# Patient Record
Sex: FEMALE | Race: WHITE | Employment: OTHER | URBAN - METROPOLITAN AREA
[De-identification: names, ages, dates, MRNs, and addresses within clinical notes are randomized per-mention and may not be internally consistent; named-entity substitution may affect disease eponyms.]

---

## 2017-01-26 ENCOUNTER — OFFICE VISIT (OUTPATIENT)
Dept: FAMILY MEDICINE CLINIC | Age: 77
End: 2017-01-26

## 2017-01-26 VITALS
BODY MASS INDEX: 25.74 KG/M2 | TEMPERATURE: 98.2 F | DIASTOLIC BLOOD PRESSURE: 84 MMHG | RESPIRATION RATE: 18 BRPM | SYSTOLIC BLOOD PRESSURE: 130 MMHG | HEIGHT: 67 IN | HEART RATE: 56 BPM | OXYGEN SATURATION: 97 % | WEIGHT: 164 LBS

## 2017-01-26 DIAGNOSIS — I48.20 CHRONIC ATRIAL FIBRILLATION (HCC): Primary | ICD-10-CM

## 2017-01-26 DIAGNOSIS — Z71.89 ACP (ADVANCE CARE PLANNING): ICD-10-CM

## 2017-01-26 RX ORDER — DILTIAZEM HYDROCHLORIDE EXTENDED-RELEASE TABLETS 240 MG/1
TABLET, EXTENDED RELEASE ORAL
COMMUNITY
End: 2018-09-12

## 2017-01-26 RX ORDER — LISINOPRIL 5 MG/1
TABLET ORAL DAILY
COMMUNITY
End: 2017-02-23 | Stop reason: SDUPTHER

## 2017-01-26 NOTE — PROGRESS NOTES
Pt here to [facility], states she has not been to . In 28 years  Was discharged from [facility] 1/19, dx'd with afib

## 2017-01-26 NOTE — MR AVS SNAPSHOT
Visit Information Date & Time Provider Department Dept. Phone Encounter #  
 1/26/2017  8:45 AM peerTransfer, [address] [phone] [id number] Follow-up Instructions Return in about 1 month (around 2/26/2017), or if symptoms worsen or fail to improve. Upcoming Health Maintenance Date Due DTaP/Tdap/Td series (1 - Tdap) 3/14/1962 ZOSTER VACCINE AGE 60> 3/14/2001 GLAUCOMA SCREENING Q2Y 3/14/2006 OSTEOPOROSIS SCREENING (DEXA) 3/14/2006 Pneumococcal 65+ Low/Medium Risk (1 of 2 - PCV13) 3/14/2006 MEDICARE YEARLY EXAM 3/14/2006 INFLUENZA AGE 9 TO ADULT 8/1/2016 Allergies as of 1/26/2017  Review Complete On: 1/26/2017 By: peerTransfer, DO No Known Allergies Current Immunizations  Never Reviewed Name Date Influenza Vaccine 1/17/2017 Not reviewed this visit You Were Diagnosed With   
  
 Codes Comments Chronic atrial fibrillation (HCC)    -  Primary ICD-10-CM: M43.8 ICD-9-CM: 427.31   
 ACP (advance care planning)     ICD-10-CM: Z71.89 ICD-9-CM: V65.49 discussed Vitals BP Pulse Temp Resp Height(growth percentile) Weight(growth percentile) 130/84 (!) 56 98.2 °F (36.8 °C) (Oral) 18 5' 7\" (1.702 m) 164 lb (74.4 kg) SpO2 BMI OB Status Smoking Status 97% 25.69 kg/m2 Menopause Former Smoker Vitals History BMI and BSA Data Body Mass Index Body Surface Area  
 25.69 kg/m 2 1.88 m 2 Preferred Pharmacy Pharmacy Name Phone [facility] [place], [address] [place] [facility] [phone] Your Updated Medication List  
  
   
This list is accurate as of: 1/26/17  9:36 AM.  Always use your most recent med list.  
  
  
  
  
 apixaban 5 mg tablet Commonly known as:  Gary Costain Take 1 Tab by mouth two (2) times a day. diltiazem hcl 240 mg Tb24 Take  by mouth.  
  
 lisinopril 5 mg tablet Commonly known as:  Angel Bajwa Take  by mouth daily. Prescriptions Sent to Pharmacy Refills  
 apixaban (ELIQUIS) 5 mg tablet 5 Sig: Take 1 Tab by mouth two (2) times a day. Class: Normal  
 Pharmacy: [facility] [address] [facility] #: [phone] Route: Oral  
  
We Performed the Following METABOLIC PANEL, COMPREHENSIVE [46313 CPT(R)] TSH 3RD GENERATION [36692 CPT(R)] Follow-up Instructions Return in about 1 month (around 2/26/2017), or if symptoms worsen or fail to improve. Patient Instructions Atrial Fibrillation: Care Instructions Your Care Instructions Atrial fibrillation is an irregular and often fast heartbeat. Treating this condition is important for several reasons. It can cause blood clots, which can travel from your heart to your brain and cause a stroke. If you have a fast heartbeat, you may feel lightheaded, dizzy, and weak. An irregular heartbeat can also increase your risk for heart failure. Atrial fibrillation is often the result of another heart condition, such as high blood pressure or coronary artery disease. Making changes to improve your heart condition will help you stay healthy and active. Follow-up care is a key part of your treatment and safety. Be sure to make and go to all appointments, and call your doctor if you are having problems. It's also a good idea to know your test results and keep a list of the medicines you take. How can you care for yourself at home? Medicines · Take your medicines exactly as prescribed. Call your doctor if you think you are having a problem with your medicine. You will get more details on the specific medicines your doctor prescribes. · If your doctor has given you a blood thinner to prevent a stroke, be sure you get instructions about how to take your medicine safely.  Blood thinners can cause serious bleeding problems. · Do not take any vitamins, over-the-counter drugs, or herbal products without talking to your doctor first. 
Lifestyle changes · Do not smoke. Smoking can increase your chance of a stroke and heart attack. If you need help quitting, talk to your doctor about stop-smoking programs and medicines. These can increase your chances of quitting for good. · Eat a heart-healthy diet. · Stay at a healthy weight. Lose weight if you need to. · Limit alcohol to 2 drinks a day for men and 1 drink a day for women. Too much alcohol can cause health problems. · Avoid colds and flu. Get a pneumococcal vaccine shot. If you have had one before, ask your doctor whether you need another dose. Get a flu shot every year. If you must be around people with colds or flu, wash your hands often. Activity · If your doctor recommends it, get more exercise. Walking is a good choice. Bit by bit, increase the amount you walk every day. Try for at least 30 minutes on most days of the week. You also may want to swim, bike, or do other activities. Your doctor may suggest that you join a cardiac rehabilitation program so that you can have help increasing your physical activity safely. · Start light exercise if your doctor says it is okay. Even a small amount will help you get stronger, have more energy, and manage stress. Walking is an easy way to get exercise. Start out by walking a little more than you did in the hospital. Gradually increase the amount you walk. · When you exercise, watch for signs that your heart is working too hard. You are pushing too hard if you cannot talk while you are exercising. If you become short of breath or dizzy or have chest pain, sit down and rest immediately. · Check your pulse regularly. Place two fingers on the artery at the palm side of your wrist, in line with your thumb. If your heartbeat seems uneven or fast, talk to your doctor. When should you call for help? Call 911 anytime you think you may need emergency care. For example, call if: 
· You have symptoms of a heart attack. These may include: ¨ Chest pain or pressure, or a strange feeling in the chest. 
¨ Sweating. ¨ Shortness of breath. ¨ Nausea or vomiting. ¨ Pain, pressure, or a strange feeling in the back, neck, jaw, or upper belly or in one or both shoulders or arms. ¨ Lightheadedness or sudden weakness. ¨ A fast or irregular heartbeat. After you call 911, the  may tell you to chew 1 adult-strength or 2 to 4 low-dose aspirin. Wait for an ambulance. Do not try to drive yourself. · You have symptoms of a stroke. These may include: 
¨ Sudden numbness, tingling, weakness, or loss of movement in your face, arm, or leg, especially on only one side of your body. ¨ Sudden vision changes. ¨ Sudden trouble speaking. ¨ Sudden confusion or trouble understanding simple statements. ¨ Sudden problems with walking or balance. ¨ A sudden, severe headache that is different from past headaches. · You passed out (lost consciousness). Call your doctor now or seek immediate medical care if: 
· You have new or increased shortness of breath. · You feel dizzy or lightheaded, or you feel like you may faint. · Your heart rate becomes irregular. · You can feel your heart flutter in your chest or skip heartbeats. Tell your doctor if these symptoms are new or worse. Watch closely for changes in your health, and be sure to contact your doctor if you have any problems. Where can you learn more? Go to http://yuly-jaime.info/. Enter U020 in the search box to learn more about \"Atrial Fibrillation: Care Instructions. \" Current as of: January 27, 2016 Content Version: 11.1 © 2079-7809 [employer].  Care instructions adapted under license by [employer] (which disclaims liability or warranty for this information). If you have questions about a medical condition or this instruction, always ask your healthcare professional. [address] any warranty or liability for your use of this information. Introducing [facility] SERVICES! [employer] introduces [employer] patient portal. Now you can access parts of your medical record, email your doctor's office, and request medication refills online. 1. In your internet browser, go to [email or web address] 2. Click on the First Time User? Click Here link in the Sign In box. You will see the New Member Sign Up page. 3. Enter your Revolution [employer] Access Code exactly as it appears below. You will not need to use this code after youve completed the sign-up process. If you do not sign up before the expiration date, you must request a new code. · [employer] Access Code: [email or web address] Expires: 4/26/2017  9:36 AM 
 
4. Enter the last four digits of your Social Security Number (xxxx) and Date of Birth (mm/dd/yyyy) as indicated and click Submit. You will be taken to the next sign-up page. 5. Create a Revolution Prep ID. This will be your Revolution [employer] login ID and cannot be changed, so think of one that is secure and easy to remember. 6. Create a Revolution [employer] password. You can change your password at any time. 7. Enter your Password Reset Question and Answer. This can be used at a later time if you forget your password. 8. Enter your e-mail address. You will receive e-mail notification when new information is available in [address] 9. Click Sign Up. You can now view and download portions of your medical record. 10. Click the Download Summary menu link to download a portable copy of your medical information. If you have questions, please visit the Frequently Asked Questions section of the Revolution Prep website. Remember, Revolution Prep is NOT to be used for urgent needs. For medical emergencies, dial 911. Now available from your iPhone and Android! Please provide this summary of care documentation to your next provider. Your primary care clinician is listed as Papi Cox. If you have any questions after today's visit, please call [phone].

## 2017-01-26 NOTE — PATIENT INSTRUCTIONS
Atrial Fibrillation: Care Instructions  Your Care Instructions    Atrial fibrillation is an irregular and often fast heartbeat. Treating this condition is important for several reasons. It can cause blood clots, which can travel from your heart to your brain and cause a stroke. If you have a fast heartbeat, you may feel lightheaded, dizzy, and weak. An irregular heartbeat can also increase your risk for heart failure. Atrial fibrillation is often the result of another heart condition, such as high blood pressure or coronary artery disease. Making changes to improve your heart condition will help you stay healthy and active. Follow-up care is a key part of your treatment and safety. Be sure to make and go to all appointments, and call your doctor if you are having problems. It's also a good idea to know your test results and keep a list of the medicines you take. How can you care for yourself at home? Medicines  · Take your medicines exactly as prescribed. Call your doctor if you think you are having a problem with your medicine. You will get more details on the specific medicines your doctor prescribes. · If your doctor has given you a blood thinner to prevent a stroke, be sure you get instructions about how to take your medicine safely. Blood thinners can cause serious bleeding problems. · Do not take any vitamins, over-the-counter drugs, or herbal products without talking to your doctor first.  Lifestyle changes  · Do not smoke. Smoking can increase your chance of a stroke and heart attack. If you need help quitting, talk to your doctor about stop-smoking programs and medicines. These can increase your chances of quitting for good. · Eat a heart-healthy diet. · Stay at a healthy weight. Lose weight if you need to. · Limit alcohol to 2 drinks a day for men and 1 drink a day for women. Too much alcohol can cause health problems. · Avoid colds and flu. Get a pneumococcal vaccine shot.  If you have had one before, ask your doctor whether you need another dose. Get a flu shot every year. If you must be around people with colds or flu, wash your hands often. Activity  · If your doctor recommends it, get more exercise. Walking is a good choice. Bit by bit, increase the amount you walk every day. Try for at least 30 minutes on most days of the week. You also may want to swim, bike, or do other activities. Your doctor may suggest that you join a cardiac rehabilitation program so that you can have help increasing your physical activity safely. · Start light exercise if your doctor says it is okay. Even a small amount will help you get stronger, have more energy, and manage stress. Walking is an easy way to get exercise. Start out by walking a little more than you did in the hospital. Gradually increase the amount you walk. · When you exercise, watch for signs that your heart is working too hard. You are pushing too hard if you cannot talk while you are exercising. If you become short of breath or dizzy or have chest pain, sit down and rest immediately. · Check your pulse regularly. Place two fingers on the artery at the palm side of your wrist, in line with your thumb. If your heartbeat seems uneven or fast, talk to your doctor. When should you call for help? Call 911 anytime you think you may need emergency care. For example, call if:  · You have symptoms of a heart attack. These may include:  ¨ Chest pain or pressure, or a strange feeling in the chest.  ¨ Sweating. ¨ Shortness of breath. ¨ Nausea or vomiting. ¨ Pain, pressure, or a strange feeling in the back, neck, jaw, or upper belly or in one or both shoulders or arms. ¨ Lightheadedness or sudden weakness. ¨ A fast or irregular heartbeat. After you call 911, the  may tell you to chew 1 adult-strength or 2 to 4 low-dose aspirin. Wait for an ambulance. Do not try to drive yourself. · You have symptoms of a stroke.  These may include:  ¨ Sudden numbness, tingling, weakness, or loss of movement in your face, arm, or leg, especially on only one side of your body. ¨ Sudden vision changes. ¨ Sudden trouble speaking. ¨ Sudden confusion or trouble understanding simple statements. ¨ Sudden problems with walking or balance. ¨ A sudden, severe headache that is different from past headaches. · You passed out (lost consciousness). Call your doctor now or seek immediate medical care if:  · You have new or increased shortness of breath. · You feel dizzy or lightheaded, or you feel like you may faint. · Your heart rate becomes irregular. · You can feel your heart flutter in your chest or skip heartbeats. Tell your doctor if these symptoms are new or worse. Watch closely for changes in your health, and be sure to contact your doctor if you have any problems. Where can you learn more? Go to http://yuly-jaime.info/. Enter U020 in the search box to learn more about \"Atrial Fibrillation: Care Instructions. \"  Current as of: January 27, 2016  Content Version: 11.1  © 2471-4211 Healthwise, Incorporated. Care instructions adapted under license by [employer] (which disclaims liability or warranty for this information). If you have questions about a medical condition or this instruction, always ask your healthcare professional. [address] any warranty or liability for your use of this information.

## 2017-01-26 NOTE — PROGRESS NOTES
Octaviano Austin is a 77 y.o. female   Chief Complaint   Patient presents with   [facility]    pt here to [facility]. Pt was recently hospitalized for A fib and taking eliquis. This will need a PA per d/c ppwk. Pt is going to be seeing cardio Dr palma(she thinks)  appt is Feb 2nd. ptis otherwise doing well, no episodes of palpitations. Pt states was having trouble breathing and palp which is why she went to [place]. Pt tolerating her meds fine. Thinks they may be cardioverting her in fuiture, states they shocked her in hosp but went back into a fib in 6-7 hours. Pt is not fasting today. Chief Complaint   Patient presents with   [address]     she is a 77y.o. year old female who presents for evalution. Reviewed PmHx, RxHx, FmHx, SocHx, AllgHx and updated and dated in the chart. Review of Systems - negative except as listed above in the HPI    Objective:     Vitals:    01/26/17 0853   BP: 130/84   Pulse: (!) 56   Resp: 18   Temp: 98.2 °F (36.8 °C)   TempSrc: Oral   SpO2: 97%   Weight: 164 lb (74.4 kg)   Height: 5' 7\" (1.702 m)       Current Outpatient Prescriptions   Medication Sig    lisinopril (PRINIVIL, ZESTRIL) 5 mg tablet Take  by mouth daily.  diltiazem hcl 240 mg Tb24 Take  by mouth.  apixaban (ELIQUIS) 5 mg tablet Take 1 Tab by mouth two (2) times a day. No current facility-administered medications for this visit.         Physical Examination: General appearance - alert, well appearing, and in no distress  Eyes - pupils equal and reactive, extraocular eye movements intact  Chest - clear to auscultation, no wheezes, rales or rhonchi, symmetric air entry  Heart - irregularly irregular rhythm with rate 36'E, systolic murmur 2/6 throughout the precordium  Abdomen - soft, nontender, nondistended, no masses or organomegaly  Extremities - peripheral pulses normal, no pedal edema, no clubbing or cyanosis  Skin - normal coloration and turgor, no rashes, no suspicious skin lesions noted      Assessment/ Plan:   Glenis Guy was seen today for establish care. Diagnoses and all orders for this visit:    Chronic atrial fibrillation (HCC)  -     METABOLIC PANEL, COMPREHENSIVE  -     TSH 3RD GENERATION  -     apixaban (ELIQUIS) 5 mg tablet; Take 1 Tab by mouth two (2) times a day. ACP (advance care planning)  Comments:  discussed       Follow-up Disposition:  Return in about 1 month (around 2/26/2017), or if symptoms worsen or fail to improve. I have discussed the diagnosis with the patient and the intended plan as seen in the above orders. The patient has received an after-visit summary and questions were answered concerning future plans. Pt conveyed understanding of plan.     Medication Side Effects and Warnings were discussed with patient      Jane Gibson, DO

## 2017-01-27 ENCOUNTER — TELEPHONE (OUTPATIENT)
Dept: FAMILY MEDICINE CLINIC | Age: 77
End: 2017-01-27

## 2017-01-27 LAB
ALBUMIN SERPL-MCNC: 4 G/DL (ref 3.5–4.8)
ALBUMIN/GLOB SERPL: 1 {RATIO} (ref 1.1–2.5)
ALP SERPL-CCNC: 88 IU/L (ref 39–117)
ALT SERPL-CCNC: 28 IU/L (ref 0–32)
AST SERPL-CCNC: 26 IU/L (ref 0–40)
BILIRUB SERPL-MCNC: 0.8 MG/DL (ref 0–1.2)
BUN SERPL-MCNC: 10 MG/DL (ref 8–27)
BUN/CREAT SERPL: 18 (ref 11–26)
CALCIUM SERPL-MCNC: 9.4 MG/DL (ref 8.7–10.3)
CHLORIDE SERPL-SCNC: 96 MMOL/L (ref 96–106)
CO2 SERPL-SCNC: 23 MMOL/L (ref 18–29)
CREAT SERPL-MCNC: 0.56 MG/DL (ref 0.57–1)
GLOBULIN SER CALC-MCNC: 3.9 G/DL (ref 1.5–4.5)
GLUCOSE SERPL-MCNC: 107 MG/DL (ref 65–99)
POTASSIUM SERPL-SCNC: 4.7 MMOL/L (ref 3.5–5.2)
PROT SERPL-MCNC: 7.9 G/DL (ref 6–8.5)
SODIUM SERPL-SCNC: 134 MMOL/L (ref 134–144)
TSH SERPL DL<=0.005 MIU/L-ACNC: 3.12 UIU/ML (ref 0.45–4.5)

## 2017-02-03 ENCOUNTER — DOCUMENTATION ONLY (OUTPATIENT)
Dept: FAMILY MEDICINE CLINIC | Age: 77
End: 2017-02-03

## 2017-02-03 NOTE — PROGRESS NOTES
Silver scripts form for Eliquis completed and placed on Dr. Ludwig Leyva desk for signature then fax.

## 2017-02-23 ENCOUNTER — OFFICE VISIT (OUTPATIENT)
Dept: FAMILY MEDICINE CLINIC | Age: 77
End: 2017-02-23

## 2017-02-23 VITALS
OXYGEN SATURATION: 98 % | BODY MASS INDEX: 26.21 KG/M2 | DIASTOLIC BLOOD PRESSURE: 90 MMHG | RESPIRATION RATE: 18 BRPM | TEMPERATURE: 98.2 F | HEIGHT: 67 IN | SYSTOLIC BLOOD PRESSURE: 158 MMHG | WEIGHT: 167 LBS | HEART RATE: 59 BPM

## 2017-02-23 DIAGNOSIS — I48.20 CHRONIC ATRIAL FIBRILLATION (HCC): Primary | ICD-10-CM

## 2017-02-23 DIAGNOSIS — I10 ESSENTIAL HYPERTENSION: ICD-10-CM

## 2017-02-23 RX ORDER — FLECAINIDE ACETATE 50 MG/1
TABLET ORAL 2 TIMES DAILY
COMMUNITY
End: 2018-01-16

## 2017-02-23 RX ORDER — LISINOPRIL 10 MG/1
10 TABLET ORAL DAILY
Qty: 30 TAB | Refills: 5 | Status: SHIPPED | OUTPATIENT
Start: 2017-02-23 | End: 2017-05-04 | Stop reason: SDUPTHER

## 2017-02-23 NOTE — MR AVS SNAPSHOT
Visit Information Date & Time Provider Department Dept. Phone Encounter #  
 2/23/2017  8:00 AM Caity RowleyJessica [phone] [id number] Follow-up Instructions Return in about 5 months (around 7/23/2017), or if symptoms worsen or fail to improve. Upcoming Health Maintenance Date Due DTaP/Tdap/Td series (1 - Tdap) 3/14/1962 ZOSTER VACCINE AGE 60> 3/14/2001 GLAUCOMA SCREENING Q2Y 3/14/2006 OSTEOPOROSIS SCREENING (DEXA) 3/14/2006 Pneumococcal 65+ Low/Medium Risk (1 of 2 - PCV13) 3/14/2006 MEDICARE YEARLY EXAM 3/14/2006 Allergies as of 2/23/2017  Review Complete On: 2/23/2017 By: Caity Rowley, DO No Known Allergies Current Immunizations  Never Reviewed Name Date Influenza Vaccine 1/17/2017 Not reviewed this visit You Were Diagnosed With   
  
 Codes Comments Chronic atrial fibrillation (HCC)    -  Primary ICD-10-CM: P58.1 ICD-9-CM: 427.31 Vitals BP  
  
  
  
  
  
 158/90 Vitals History BMI and BSA Data Body Mass Index Body Surface Area  
 26.16 kg/m 2 1.89 m 2 Preferred Pharmacy Pharmacy Name Phone [facility] [place], [address] [place] AT [facility] [phone] Your Updated Medication List  
  
   
This list is accurate as of: 2/23/17  9:03 AM.  Always use your most recent med list.  
  
  
  
  
 apixaban 5 mg tablet Commonly known as:  Concha Loll Take 1 Tab by mouth two (2) times a day. diltiazem hcl 240 mg Tb24 Take  by mouth. flecainide 50 mg tablet Commonly known as:  TAMBOCOR Take  by mouth two (2) times a day. lisinopril 5 mg tablet Commonly known as:  Sofia Pinch Take  by mouth daily. We Performed the Following LIPID PANEL [40440 CPT(R)] Follow-up Instructions Return in about 5 months (around 7/23/2017), or if symptoms worsen or fail to improve. Patient Instructions Cholesterol and Triglycerides Tests: About These Tests What are they? Cholesterol and triglycerides tests measure the amount of fats in your blood. These fats have both \"good\" (HDL) and \"bad\" (LDL) cholesterol. Why are these tests done? These tests are done to help find out your risk of a heart attack and stroke. They can help your doctor find out how well medicine is working for some health problems. How can you prepare for these tests? · Your doctor may tell you to fast before your tests. This means that you do not eat or drink anything except water for 9 to 12 hours before the tests. In most cases, you can take your medicines with water the morning of the test. 
· Do not eat high-fat foods the night before the tests. · Do not drink alcohol or do intense exercise the night before the tests. · Be sure to tell your doctor about all the over-the-counter and prescription medicines and herbs or other supplements you take. They can affect the results of these tests. What happens during these tests? A health professional takes a sample of your blood. What else should you know about these tests? Your cholesterol levels can help your doctor find out your risk for having a heart attack or stroke. But it's not just about your cholesterol. Your doctor uses your cholesterol levels plus other things to calculate your risk. These include: 
· Your blood pressure. · Whether or not you have diabetes. · Your age, sex, and race. · Whether or not you smoke. You and your doctor can talk about whether you need to lower your risk and what treatment is best for you. Where can you learn more? Go to http://yuly-jaime.info/. Enter C265 in the search box to learn more about \"Cholesterol and Triglycerides Tests: About These Tests. \" Current as of: June 30, 2016 Content Version: 11.1 © 5412-9710 [employer], [employer]orporated.  Care instructions adapted under license by [address] (which disclaims liability or warranty for this information). If you have questions about a medical condition or this instruction, always ask your healthcare professional. [address] any warranty or liability for your use of this information. Introducing [facility]! [facility] introduces [employer] patient portal. Now you can access parts of your medical record, email your doctor's office, and request medication refills online. 1. In your internet browser, go to [email or web address] 2. Click on the First Time User? Click Here link in the Sign In box. You will see the New Member Sign Up page. 3. Enter your [employer] Access Code exactly as it appears below. You will not need to use this code after youve completed the sign-up process. If you do not sign up before the expiration date, you must request a new code. · [employer] Access Code: [email or web address] Expires: 4/26/2017  9:36 AM 
 
4. Enter the last four digits of your Social Security Number (xxxx) and Date of Birth (mm/dd/yyyy) as indicated and click Submit. You will be taken to the next sign-up page. 5. Create a [employer] ID. This will be your [employer] login ID and cannot be changed, so think of one that is secure and easy to remember. 6. Create a [employer] password. You can change your password at any time. 7. Enter your Password Reset Question and Answer. This can be used at a later time if you forget your password. 8. Enter your e-mail address. You will receive e-mail notification when new information is available in [address] 9. Click Sign Up. You can now view and download portions of your medical record. 10. Click the Download Summary menu link to download a portable copy of your medical information. If you have questions, please visit the Frequently Asked Questions section of the [employer] website.  Remember, [employer] is NOT to be used for urgent needs. For medical emergencies, dial 911. Now available from your iPhone and Android! Please provide this summary of care documentation to your next provider. Your primary care clinician is listed as Jane Gibson. If you have any questions after today's visit, please call [phone].

## 2017-02-23 NOTE — PATIENT INSTRUCTIONS
Cholesterol and Triglycerides Tests: About These Tests  What are they? Cholesterol and triglycerides tests measure the amount of fats in your blood. These fats have both \"good\" (HDL) and \"bad\" (LDL) cholesterol. Why are these tests done? These tests are done to help find out your risk of a heart attack and stroke. They can help your doctor find out how well medicine is working for some health problems. How can you prepare for these tests? · Your doctor may tell you to fast before your tests. This means that you do not eat or drink anything except water for 9 to 12 hours before the tests. In most cases, you can take your medicines with water the morning of the test.  · Do not eat high-fat foods the night before the tests. · Do not drink alcohol or do intense exercise the night before the tests. · Be sure to tell your doctor about all the over-the-counter and prescription medicines and herbs or other supplements you take. They can affect the results of these tests. What happens during these tests? A health professional takes a sample of your blood. What else should you know about these tests? Your cholesterol levels can help your doctor find out your risk for having a heart attack or stroke. But it's not just about your cholesterol. Your doctor uses your cholesterol levels plus other things to calculate your risk. These include:  · Your blood pressure. · Whether or not you have diabetes. · Your age, sex, and race. · Whether or not you smoke. You and your doctor can talk about whether you need to lower your risk and what treatment is best for you. Where can you learn more? Go to http://yuly-jaime.info/. Enter Y128 in the search box to learn more about \"Cholesterol and Triglycerides Tests: About These Tests. \"  Current as of: June 30, 2016  Content Version: 11.1  © 3891-4275 [employer], dotHIV.  Care instructions adapted under license by [employer] (which disclaims liability or warranty for this information). If you have questions about a medical condition or this instruction, always ask your healthcare professional. [place] [address] any warranty or liability for your use of this information.

## 2017-02-23 NOTE — PROGRESS NOTES
Venu Chatman is a 77 y.o. female   Chief Complaint   Patient presents with    Labs    pt here for fasting labs, pt states she has never had a chol check before that she knows of. Pt currently only has medicare part A due to not applying for it, but pt has recently applied for medicaid. Pt did see cardio and has a f/u in 2 weeks. Pt is otherwise doing well.      she is a 77y.o. year old female who presents for evalution. Reviewed PmHx, RxHx, FmHx, SocHx, AllgHx and updated and dated in the chart. Review of Systems - negative except as listed above in the HPI    Objective:     Vitals:    02/23/17 0828   BP: 158/90   Pulse: (!) 59   Resp: 18   Temp: 98.2 °F (36.8 °C)   TempSrc: Oral   SpO2: 98%   Weight: 167 lb (75.8 kg)   Height: 5' 7\" (1.702 m)       Current Outpatient Prescriptions   Medication Sig    flecainide (TAMBOCOR) 50 mg tablet Take  by mouth two (2) times a day.  lisinopril (PRINIVIL, ZESTRIL) 10 mg tablet Take 1 Tab by mouth daily.  diltiazem hcl 240 mg Tb24 Take  by mouth.  apixaban (ELIQUIS) 5 mg tablet Take 1 Tab by mouth two (2) times a day. No current facility-administered medications for this visit. Physical Examination: General appearance - alert, well appearing, and in no distress  Eyes - pupils equal and reactive, extraocular eye movements intact  Chest - clear to auscultation, no wheezes, rales or rhonchi, symmetric air entry  Heart - normal rate, regular rhythm, normal S1, S2, no murmurs, rubs, clicks or gallops  Abdomen - soft, nontender, nondistended, no masses or organomegaly      Assessment/ Plan:   Annalise Pedroza was seen today for labs. Diagnoses and all orders for this visit:    Chronic atrial fibrillation ([address]  -     LIPID PANEL    Essential hypertension  -     lisinopril (PRINIVIL, ZESTRIL) 10 mg tablet; Take 1 Tab by mouth daily. Follow-up Disposition:  Return in about 5 months (around 7/23/2017), or if symptoms worsen or fail to improve.     I have discussed the diagnosis with the patient and the intended plan as seen in the above orders. The patient has received an after-visit summary and questions were answered concerning future plans. Pt conveyed understanding of plan.     Medication Side Effects and Warnings were discussed with patient      Clearence Plana, DO

## 2017-02-24 LAB
CHOLEST SERPL-MCNC: 177 MG/DL (ref 100–199)
HDLC SERPL-MCNC: 49 MG/DL
INTERPRETATION, 910389: NORMAL
LDLC SERPL CALC-MCNC: 111 MG/DL (ref 0–99)
TRIGL SERPL-MCNC: 85 MG/DL (ref 0–149)
VLDLC SERPL CALC-MCNC: 17 MG/DL (ref 5–40)

## 2017-02-24 NOTE — PROGRESS NOTES
LDL slightly elevated otherwise chol looks great, work on low chol diet and getting exercise 3-5 days per week and we can recheck this in 6 months

## 2017-03-29 ENCOUNTER — DOCUMENTATION ONLY (OUTPATIENT)
Dept: FAMILY MEDICINE CLINIC | Age: 77
End: 2017-03-29

## 2017-03-29 NOTE — PROGRESS NOTES
Suma from [facility] dropped off Medical Records.  They are in the bin up front

## 2017-05-04 DIAGNOSIS — I10 ESSENTIAL HYPERTENSION: ICD-10-CM

## 2017-05-04 RX ORDER — LISINOPRIL 10 MG/1
TABLET ORAL
Qty: 90 TAB | Refills: 5 | Status: SHIPPED | OUTPATIENT
Start: 2017-05-04 | End: 2017-09-06 | Stop reason: SDUPTHER

## 2017-08-03 DIAGNOSIS — I48.20 CHRONIC ATRIAL FIBRILLATION (HCC): ICD-10-CM

## 2017-08-03 RX ORDER — APIXABAN 5 MG/1
TABLET, FILM COATED ORAL
Qty: 60 TAB | Refills: 0 | Status: SHIPPED | OUTPATIENT
Start: 2017-08-03 | End: 2017-09-06 | Stop reason: SDUPTHER

## 2017-09-01 DIAGNOSIS — I10 ESSENTIAL HYPERTENSION: ICD-10-CM

## 2017-09-01 RX ORDER — LISINOPRIL 10 MG/1
TABLET ORAL
Qty: 30 TAB | Refills: 0 | Status: SHIPPED | OUTPATIENT
Start: 2017-09-01 | End: 2017-09-06 | Stop reason: SDUPTHER

## 2017-09-06 ENCOUNTER — OFFICE VISIT (OUTPATIENT)
Dept: FAMILY MEDICINE CLINIC | Age: 77
End: 2017-09-06

## 2017-09-06 VITALS
SYSTOLIC BLOOD PRESSURE: 144 MMHG | RESPIRATION RATE: 18 BRPM | HEART RATE: 68 BPM | WEIGHT: 185 LBS | BODY MASS INDEX: 29.03 KG/M2 | DIASTOLIC BLOOD PRESSURE: 86 MMHG | TEMPERATURE: 97.8 F | HEIGHT: 67 IN | OXYGEN SATURATION: 98 %

## 2017-09-06 DIAGNOSIS — I48.20 CHRONIC ATRIAL FIBRILLATION (HCC): ICD-10-CM

## 2017-09-06 DIAGNOSIS — I10 ESSENTIAL HYPERTENSION: ICD-10-CM

## 2017-09-06 DIAGNOSIS — M65.4 DE QUERVAIN'S TENOSYNOVITIS, RIGHT: Primary | ICD-10-CM

## 2017-09-06 DIAGNOSIS — M75.81 ROTATOR CUFF TENDINITIS, RIGHT: ICD-10-CM

## 2017-09-06 DIAGNOSIS — Z23 ENCOUNTER FOR IMMUNIZATION: ICD-10-CM

## 2017-09-06 RX ORDER — LISINOPRIL 10 MG/1
TABLET ORAL
Qty: 90 TAB | Refills: 3 | Status: SHIPPED | OUTPATIENT
Start: 2017-09-06 | End: 2017-10-11 | Stop reason: SDUPTHER

## 2017-09-06 RX ORDER — PREDNISONE 10 MG/1
TABLET ORAL
Qty: 21 TAB | Refills: 0 | Status: SHIPPED | OUTPATIENT
Start: 2017-09-06 | End: 2017-10-11 | Stop reason: ALTCHOICE

## 2017-09-06 NOTE — MR AVS SNAPSHOT
Visit Information Date & Time Provider Department Dept. Phone Encounter #  
 9/6/2017 10:00 AM Lila Valverde, Jessica Longoria [phone] [id number] Follow-up Instructions Return in about 1 month (around 10/6/2017), or if symptoms worsen or fail to improve, for welcome to medicare. Upcoming Health Maintenance Date Due DTaP/Tdap/Td series (1 - Tdap) 3/14/1962 ZOSTER VACCINE AGE 60> 1/14/2001 GLAUCOMA SCREENING Q2Y 3/14/2006 OSTEOPOROSIS SCREENING (DEXA) 3/14/2006 Pneumococcal 65+ Low/Medium Risk (1 of 2 - PCV13) 3/14/2006 MEDICARE YEARLY EXAM 3/14/2006 INFLUENZA AGE 9 TO ADULT 8/1/2017 Allergies as of 9/6/2017  Review Complete On: 9/6/2017 By: Lila Valverde, DO No Known Allergies Current Immunizations  Never Reviewed Name Date Influenza Vaccine 1/17/2017 Influenza Vaccine (Quad) PF  Incomplete Pneumococcal Polysaccharide (PPSV-23)  Incomplete Not reviewed this visit You Were Diagnosed With   
  
 Codes Comments De Quervain's tenosynovitis, right    -  Primary ICD-10-CM: M65.4 ICD-9-CM: 727.04 Chronic atrial fibrillation (HCC)     ICD-10-CM: Z42.5 ICD-9-CM: 427.31 Essential hypertension     ICD-10-CM: I10 
ICD-9-CM: 401.9 Rotator cuff tendinitis, right     ICD-10-CM: M75.81 ICD-9-CM: 726.10 Encounter for immunization     ICD-10-CM: X30 ICD-9-CM: V03.89 Vitals BP Pulse Temp Resp Height(growth percentile) Weight(growth percentile) 144/86 68 97.8 °F (36.6 °C) (Oral) 18 5' 7\" (1.702 m) 185 lb (83.9 kg) SpO2 BMI OB Status Smoking Status 98% 28.98 kg/m2 Menopause Former Smoker Vitals History BMI and BSA Data Body Mass Index Body Surface Area  
 28.98 kg/m 2 1.99 m 2 Preferred Pharmacy Pharmacy Name Phone [facility] [place], [address] [place] [facility] [phone] Your Updated Medication List  
 This list is accurate as of: 9/6/17 10:36 AM.  Always use your most recent med list.  
  
  
  
  
 apixaban 5 mg tablet Commonly known as:  ELIQUIS  
TAKE 1 TABLET BY MOUTH TWICE DAILY  
  
 dilTIAZem  mg Tb24 tablet Commonly known as:  CARDIZEM LA Take  by mouth. flecainide 50 mg tablet Commonly known as:  TAMBOCOR Take  by mouth two (2) times a day. lisinopril 10 mg tablet Commonly known as:  PRINIVIL, ZESTRIL  
TAKE 1 TABLET BY MOUTH DAILY predniSONE 10 mg dose pack Commonly known as:  STERAPRED DS See administration instruction per 10mg dose pack Prescriptions Sent to Pharmacy Refills  
 apixaban (ELIQUIS) 5 mg tablet 3 Sig: TAKE 1 TABLET BY MOUTH TWICE DAILY Class: Normal  
 Pharmacy: [address] Ph #: [phone]  
 lisinopril (PRINIVIL, ZESTRIL) 10 mg tablet 3 Sig: TAKE 1 TABLET BY MOUTH DAILY Class: Normal  
 Pharmacy: [address] Ph #: [phone]  
 predniSONE (STERAPRED DS) 10 mg dose pack 0 Sig: See administration instruction per 10mg dose pack Class: Normal  
 Pharmacy: [address] [place] [address] Ph #: [phone] We Performed the Following INFLUENZA VIRUS VAC QUAD,SPLIT,PRESV FREE SYRINGE 3/> YRS IM [id number] CPT(R)] PNEUMOCOCCAL POLYSACCHARIDE VACCINE, 23-VALENT, ADULT OR IMMUNOSUPPRESSED PT DOSE, [85518 CPT(R)] NV IMMUNIZ ADMIN,1 SINGLE/COMB VAC/TOXOID [id number] CPT(R)] Follow-up Instructions Return in about 1 month (around 10/6/2017), or if symptoms worsen or fail to improve, for welcome to medicare. Patient Instructions Rotator Cuff Rehabilitation What is rotator cuff rehabilitation? Rotator cuff rehabilitation is a series of exercises you do after your surgery. It helps you get back your shoulder's range of motion and strength. You will work with your doctor and physical therapist to plan this exercise program. To get the best results, you need to do the exercises correctly and as often as your doctor tells you. Before you start any exercises, talk with your doctor or physical therapist. It is important that you know exactly how to do the exercises. Stop and call your doctor if you are not sure that you are doing the exercises correctly or if you have any pain. Hearing clicks and pops during exercise is not always cause for concern, but a grinding feeling may mean a more serious problem. Ice your shoulder after exercising if it is sore. Follow-up care is a key part of your treatment and safety. Be sure to make and go to all appointments, and call your doctor if you are having problems. It's also a good idea to know your test results and keep a list of the medicines you take. Stretching exercises Do not start doing stretching exercises until your doctor says you can. Your doctor will tell you which exercises to do, and how often and how long to do them. Posterior stretch · Stand upright with your feet shoulder-width apart. · Put the hand of your affected arm on the opposite shoulder, and hold the elbow to your body. · Then, using your good arm, hold the elbow of your affected arm and move it gently up, away from, and across your body. External rotation · Hold a lightweight stick or justin in your good arm. It should be about 2 feet long. A curtain justin may work well. · Lie on your back with your elbows next to your sides. Rest the elbow of your affected arm on a small pillow or folded towel. · Set your arms so that the elbows are bent at a 90-degree angle, like the letter \"L. \" Your hands will point straight up. · Hold the stick with both hands.  Use your good arm to push the stick toward the affected arm so the affected arm moves outward, away from your body. Stop when you feel the arm stretching. Strength exercises Do not start strength exercises until your doctor says you can. Usually, this is at least 6 to 8 weeks after surgery. Your doctor will tell you how often and how long to do the exercises. Arm raises to the side · Stand upright with your feet shoulder-width apart and your affected arm at your side. · Slowly raise your injured arm to the side, with your thumb facing up. Raise your arm 60 degrees at the most (shoulder level is 90 degrees). · After holding the position for 3 to 5 seconds, lower your arm back to your side. If you need to, bring your \"good\" arm across your body and place it under the elbow as you lower your injured arm. Use your good arm to keep your injured arm from dropping down too fast during the downward motion. · Repeat 8 to 12 times. · When you first start out, don't hold any additional weight in your hand. As your strength improves, you may use a 1- to 2-pound dumbbell or a small can of food. Shoulder flexor · Stand facing a wall. Your body should be about 6 inches away from the wall. · Keep your affected arm and elbow to your side, and bend your elbow so that your arm is pointing toward the wall. · Make a closed fist with your thumb on top. · Push your hand into the wall and hold it for 6 seconds. Push with 25% to 50% of the force you have. Shoulder extension · Stand with your back flat against a wall. · Keep your affected arm and elbow at your side, and bend your elbow so that your upper arm is against the wall and your lower arm is pointing straight ahead. Make a closed fist with your thumb on top. · Push your elbow gently back against the wall, holding for 6 seconds. Push with 25% to 50% of the force you have. Where can you learn more? Go to http://yuly-jaime.info/. Enter S479 in the search box to learn more about \"Rotator Cuff Rehabilitation. \" Current as of: March 21, 2017 Content Version: 11.3 © 8233-0406 emoteShare. Care instructions adapted under license by [employer] (which disclaims liability or warranty for this information). If you have questions about a medical condition or this instruction, always ask your healthcare professional. [address] any warranty or liability for your use of this information. Gaylan Mittie Disease: Exercises Your Care Instructions Here are some examples of typical rehabilitation exercises for your condition. Start each exercise slowly. Ease off the exercise if you start to have pain. Your doctor or your physical or occupational therapist will tell you when you can start these exercises and which ones will work best for you. How to do the exercises Thumb lifts 1. Place your hand on a flat surface, with your palm up. 2. Lift your thumb away from your palm to make a \"C\" shape. 3. Hold for about 6 seconds. 4. Repeat 8 to 12 times. Passive thumb MP flexion 1. Hold your hand in front of you, and turn your hand so your little finger faces down and your thumb faces up. (Your hand should be in the position used for shaking someone's hand.) You may also rest your hand on a flat surface. 2. Use the fingers on your other hand to bend your thumb down at the point where your thumb connects to your palm. 3. Hold for at least 15 to 30 seconds. 4. Repeat 2 to 4 times. Finkelstein stretch 1. Hold your arms out in front of you. (Your hand should be in the position used for shaking someone's hand.) 2. Bend your thumb toward your palm. 3. Use your other hand to gently stretch your thumb and wrist downward until you feel the stretch on the thumb side of your wrist. 
4. Hold for at least 15 to 30 seconds. 5. Repeat 2 to 4 times. Resisted ulnar deviation Note: For this exercise, you will need elastic exercise material, such as surgical tubing or Thera-Band. 1. Sit leaning forward with your legs slightly spread and your elbow on your thigh. 2. Grasp one end of the band with your palm down, and step on the other end with the foot opposite the hand holding the band. 3. Slowly bend your wrist sideways and away from your knee. 4. Repeat 8 to 12 times. Follow-up care is a key part of your treatment and safety. Be sure to make and go to all appointments, and call your doctor if you are having problems. It's also a good idea to know your test results and keep a list of the medicines you take. Where can you learn more? Go to http://yuly-jaime.info/. Enter U154 in the search box to learn more about \"De Quervain's Disease: Exercises. \" Current as of: March 21, 2017 Content Version: 11.3 © 8718-5728 Cover. Care instructions adapted under license by [employer] (which disclaims liability or warranty for this information). If you have questions about a medical condition or this instruction, always ask your healthcare professional. [address] any warranty or liability for your use of this information. Introducing [facility]! Roni Toussaint introduces [employer] patient portal. Now you can access parts of your medical record, email your doctor's office, and request medication refills online. 1. In your internet browser, go to [email or web address] 2. Click on the First Time User? Click Here link in the Sign In box. You will see the New Member Sign Up page. 3. Enter your [employer] Access Code exactly as it appears below. You will not need to use this code after youve completed the sign-up process. If you do not sign up before the expiration date, you must request a new code. · [employer] Access Code: [email or web address] Expires: 12/5/2017 10:13 AM 
 
 4. Enter the last four digits of your Social Security Number (xxxx) and Date of Birth (mm/dd/yyyy) as indicated and click Submit. You will be taken to the next sign-up page. 5. Create a [employer] ID. This will be your [employer] login ID and cannot be changed, so think of one that is secure and easy to remember. 6. Create a [employer] password. You can change your password at any time. 7. Enter your Password Reset Question and Answer. This can be used at a later time if you forget your password. 8. Enter your e-mail address. You will receive e-mail notification when new information is available in [address] 9. Click Sign Up. You can now view and download portions of your medical record. 10. Click the Download Summary menu link to download a portable copy of your medical information. If you have questions, please visit the Frequently Asked Questions section of the [employer] website. Remember, [employer] is NOT to be used for urgent needs. For medical emergencies, dial 911. Now available from your iPhone and Android! Please provide this summary of care documentation to your next provider. Your primary care clinician is listed as Nya Hood. If you have any questions after today's visit, please call [phone].

## 2017-09-06 NOTE — PROGRESS NOTES
Alphia Mcburney is a 69 y.o. female   Chief Complaint   Patient presents with    Follow-up    Arm Pain     right    pt here for refill of her lisinopril and eliquis she uses for BP and a fib and tolerates both without issue and  Her BP is at goal for her age. Pt is now due for a medicare welcome visit and will schedule this in the next month. Pt reports R arm pain and R wrist pain thumb aspect proximal to mcp joint. Has tried tylenol with no relief. Pt obviously can not take nsaid's given eliquis. discomfort has been consistent when it comes on but is not always present worse in shoulder when raising arm    she is a 69y.o. year old female who presents for evalution. Reviewed PmHx, RxHx, FmHx, SocHx, AllgHx and updated and dated in the chart. Review of Systems - negative except as listed above in the HPI    Objective:     Vitals:    09/06/17 1016   BP: 144/86   Pulse: 68   Resp: 18   Temp: 97.8 °F (36.6 °C)   TempSrc: Oral   SpO2: 98%   Weight: 185 lb (83.9 kg)   Height: 5' 7\" (1.702 m)       Current Outpatient Prescriptions   Medication Sig    apixaban (ELIQUIS) 5 mg tablet TAKE 1 TABLET BY MOUTH TWICE DAILY    lisinopril (PRINIVIL, ZESTRIL) 10 mg tablet TAKE 1 TABLET BY MOUTH DAILY    predniSONE (STERAPRED DS) 10 mg dose pack See administration instruction per 10mg dose pack    diltiazem hcl 240 mg Tb24 Take  by mouth.  flecainide (TAMBOCOR) 50 mg tablet Take  by mouth two (2) times a day. No current facility-administered medications for this visit.         Physical Examination: General appearance - alert, well appearing, and in no distress  Eyes - pupils equal and reactive, extraocular eye movements intact  Chest - clear to auscultation, no wheezes, rales or rhonchi, symmetric air entry  Heart - normal rate, regular rhythm, normal S1, S2, no murmurs, rubs, clicks or gallops  Musculoskeletal - + finkelstein test on R, pain with resisted abduction and with resisted ext rotation of R arm, no bony tenderness      Assessment/ Plan:   Diagnoses and all orders for this visit:    1. De Quervain's tenosynovitis, right  -     predniSONE (STERAPRED DS) 10 mg dose pack; See administration instruction per 10mg dose pack    2. Chronic atrial fibrillation (HCC)  -     apixaban (ELIQUIS) 5 mg tablet; TAKE 1 TABLET BY MOUTH TWICE DAILY    3. Essential hypertension  -     lisinopril (PRINIVIL, ZESTRIL) 10 mg tablet; TAKE 1 TABLET BY MOUTH DAILY    4. Rotator cuff tendinitis, right  -     predniSONE (STERAPRED DS) 10 mg dose pack; See administration instruction per 10mg dose pack    5. Encounter for immunization  -     Pneumococcal polysaccharide vaccine, 23-valent, adult or immunosuppressed pt dose  -     ID IMMUNIZ ADMIN,1 SINGLE/COMB VAC/TOXOID  -     Influenza virus vaccine (FLUZONE HIGH-DOSE) 65 years and older       Follow-up Disposition:  Return in about 1 month (around 10/6/2017), or if symptoms worsen or fail to improve, for welcome to medicare. I have discussed the diagnosis with the patient and the intended plan as seen in the above orders. The patient has received an after-visit summary and questions were answered concerning future plans. Pt conveyed understanding of plan.     Medication Side Effects and Warnings were discussed with patient      Mar Omalley DO

## 2017-09-06 NOTE — PATIENT INSTRUCTIONS
Rotator Cuff Rehabilitation  What is rotator cuff rehabilitation? Rotator cuff rehabilitation is a series of exercises you do after your surgery. It helps you get back your shoulder's range of motion and strength. You will work with your doctor and physical therapist to plan this exercise program. To get the best results, you need to do the exercises correctly and as often as your doctor tells you. Before you start any exercises, talk with your doctor or physical therapist. It is important that you know exactly how to do the exercises. Stop and call your doctor if you are not sure that you are doing the exercises correctly or if you have any pain. Hearing clicks and pops during exercise is not always cause for concern, but a grinding feeling may mean a more serious problem. Ice your shoulder after exercising if it is sore. Follow-up care is a key part of your treatment and safety. Be sure to make and go to all appointments, and call your doctor if you are having problems. It's also a good idea to know your test results and keep a list of the medicines you take. Stretching exercises  Do not start doing stretching exercises until your doctor says you can. Your doctor will tell you which exercises to do, and how often and how long to do them. Posterior stretch  · Stand upright with your feet shoulder-width apart. · Put the hand of your affected arm on the opposite shoulder, and hold the elbow to your body. · Then, using your good arm, hold the elbow of your affected arm and move it gently up, away from, and across your body. External rotation  · Hold a lightweight stick or justin in your good arm. It should be about 2 feet long. A curtain justin may work well. · Lie on your back with your elbows next to your sides. Rest the elbow of your affected arm on a small pillow or folded towel. · Set your arms so that the elbows are bent at a 90-degree angle, like the letter \"L. \" Your hands will point straight up.  · Hold the stick with both hands. Use your good arm to push the stick toward the affected arm so the affected arm moves outward, away from your body. Stop when you feel the arm stretching. Strength exercises  Do not start strength exercises until your doctor says you can. Usually, this is at least 6 to 8 weeks after surgery. Your doctor will tell you how often and how long to do the exercises. Arm raises to the side  · Stand upright with your feet shoulder-width apart and your affected arm at your side. · Slowly raise your injured arm to the side, with your thumb facing up. Raise your arm 60 degrees at the most (shoulder level is 90 degrees). · After holding the position for 3 to 5 seconds, lower your arm back to your side. If you need to, bring your \"good\" arm across your body and place it under the elbow as you lower your injured arm. Use your good arm to keep your injured arm from dropping down too fast during the downward motion. · Repeat 8 to 12 times. · When you first start out, don't hold any additional weight in your hand. As your strength improves, you may use a 1- to 2-pound dumbbell or a small can of food. Shoulder flexor  · Stand facing a wall. Your body should be about 6 inches away from the wall. · Keep your affected arm and elbow to your side, and bend your elbow so that your arm is pointing toward the wall. · Make a closed fist with your thumb on top. · Push your hand into the wall and hold it for 6 seconds. Push with 25% to 50% of the force you have. Shoulder extension  · Stand with your back flat against a wall. · Keep your affected arm and elbow at your side, and bend your elbow so that your upper arm is against the wall and your lower arm is pointing straight ahead. Make a closed fist with your thumb on top. · Push your elbow gently back against the wall, holding for 6 seconds. Push with 25% to 50% of the force you have. Where can you learn more?   Go to http://yuly-jaime.info/. Enter Z495 in the search box to learn more about \"Rotator Cuff Rehabilitation. \"  Current as of: March 21, 2017  Content Version: 11.3  © 2504-5193 [employer]. Care instructions adapted under license by [employer] (which disclaims liability or warranty for this information). If you have questions about a medical condition or this instruction, always ask your healthcare professional. [place] [address] any warranty or liability for your use of this information. Thresea Ax Disease: Exercises  Your Care Instructions  Here are some examples of typical rehabilitation exercises for your condition. Start each exercise slowly. Ease off the exercise if you start to have pain. Your doctor or your physical or occupational therapist will tell you when you can start these exercises and which ones will work best for you. How to do the exercises  Thumb lifts    1. Place your hand on a flat surface, with your palm up. 2. Lift your thumb away from your palm to make a \"C\" shape. 3. Hold for about 6 seconds. 4. Repeat 8 to 12 times. Passive thumb MP flexion    1. Hold your hand in front of you, and turn your hand so your little finger faces down and your thumb faces up. (Your hand should be in the position used for shaking someone's hand.) You may also rest your hand on a flat surface. 2. Use the fingers on your other hand to bend your thumb down at the point where your thumb connects to your palm. 3. Hold for at least 15 to 30 seconds. 4. Repeat 2 to 4 times. Finkelstein stretch    1. Hold your arms out in front of you. (Your hand should be in the position used for shaking someone's hand.)  2. Bend your thumb toward your palm. 3. Use your other hand to gently stretch your thumb and wrist downward until you feel the stretch on the thumb side of your wrist.  4. Hold for at least 15 to 30 seconds.   5. Repeat 2 to 4 times. Resisted ulnar deviation    Note: For this exercise, you will need elastic exercise material, such as surgical tubing or Thera-Band. 1. Sit leaning forward with your legs slightly spread and your elbow on your thigh. 2. Grasp one end of the band with your palm down, and step on the other end with the foot opposite the hand holding the band. 3. Slowly bend your wrist sideways and away from your knee. 4. Repeat 8 to 12 times. Follow-up care is a key part of your treatment and safety. Be sure to make and go to all appointments, and call your doctor if you are having problems. It's also a good idea to know your test results and keep a list of the medicines you take. Where can you learn more? Go to http://yuly-jaime.info/. Enter A810 in the search box to learn more about \"De Quervain's Disease: Exercises. \"  Current as of: March 21, 2017  Content Version: 11.3  © 4160-2500 [employer]. Care instructions adapted under license by [employer] (which disclaims liability or warranty for this information). If you have questions about a medical condition or this instruction, always ask your healthcare professional. [address] any warranty or liability for your use of this information.

## 2017-09-09 DIAGNOSIS — I48.20 CHRONIC ATRIAL FIBRILLATION (HCC): ICD-10-CM

## 2017-09-11 RX ORDER — APIXABAN 5 MG/1
TABLET, FILM COATED ORAL
Qty: 60 TAB | Refills: 0 | Status: SHIPPED | OUTPATIENT
Start: 2017-09-11 | End: 2017-10-11 | Stop reason: SDUPTHER

## 2017-09-29 ENCOUNTER — DOCUMENTATION ONLY (OUTPATIENT)
Dept: FAMILY MEDICINE CLINIC | Age: 77
End: 2017-09-29

## 2017-10-11 ENCOUNTER — HOSPITAL ENCOUNTER (OUTPATIENT)
Dept: LAB | Age: 77
Discharge: HOME | End: 2017-10-11
Payer: MEDICARE

## 2017-10-11 ENCOUNTER — OFFICE VISIT (OUTPATIENT)
Dept: FAMILY MEDICINE CLINIC | Age: 77
End: 2017-10-11

## 2017-10-11 VITALS
DIASTOLIC BLOOD PRESSURE: 80 MMHG | HEIGHT: 67 IN | HEART RATE: 82 BPM | TEMPERATURE: 98.2 F | SYSTOLIC BLOOD PRESSURE: 160 MMHG | RESPIRATION RATE: 18 BRPM | BODY MASS INDEX: 29.98 KG/M2 | WEIGHT: 191 LBS | OXYGEN SATURATION: 97 %

## 2017-10-11 DIAGNOSIS — E78.00 ELEVATED CHOLESTEROL: ICD-10-CM

## 2017-10-11 DIAGNOSIS — M54.50 ACUTE BILATERAL LOW BACK PAIN WITHOUT SCIATICA: Primary | ICD-10-CM

## 2017-10-11 DIAGNOSIS — Z00.00 INITIAL MEDICARE ANNUAL WELLNESS VISIT: ICD-10-CM

## 2017-10-11 DIAGNOSIS — I10 ESSENTIAL HYPERTENSION: ICD-10-CM

## 2017-10-11 DIAGNOSIS — Z13.39 SCREENING FOR ALCOHOLISM: ICD-10-CM

## 2017-10-11 DIAGNOSIS — E28.39 ESTROGEN DEFICIENCY: ICD-10-CM

## 2017-10-11 PROCEDURE — 80061 LIPID PANEL: CPT

## 2017-10-11 PROCEDURE — 83036 HEMOGLOBIN GLYCOSYLATED A1C: CPT

## 2017-10-11 PROCEDURE — 80053 COMPREHEN METABOLIC PANEL: CPT

## 2017-10-11 RX ORDER — DICLOFENAC SODIUM 10 MG/G
2 GEL TOPICAL 4 TIMES DAILY
Qty: 100 G | Refills: 11 | Status: SHIPPED | OUTPATIENT
Start: 2017-10-11 | End: 2019-04-16

## 2017-10-11 RX ORDER — LISINOPRIL 10 MG/1
TABLET ORAL
Qty: 90 TAB | Refills: 3 | Status: SHIPPED | OUTPATIENT
Start: 2017-10-11 | End: 2018-11-14 | Stop reason: SDUPTHER

## 2017-10-11 NOTE — MR AVS SNAPSHOT
Visit Information Date & Time Provider Department Dept. Phone Encounter #  
 10/11/2017 10:00 AM Manasted HowardJessica S Darleen Longoria [phone] [id number] Follow-up Instructions Return if symptoms worsen or fail to improve. Upcoming Health Maintenance Date Due DTaP/Tdap/Td series (1 - Tdap) 3/14/1962 ZOSTER VACCINE AGE 60> 1/14/2001 GLAUCOMA SCREENING Q2Y 3/14/2006 OSTEOPOROSIS SCREENING (DEXA) 3/14/2006 MEDICARE YEARLY EXAM 3/14/2006 Pneumococcal 65+ Low/Medium Risk (2 of 2 - PCV13) 9/6/2018 Allergies as of 10/11/2017  Review Complete On: 10/11/2017 By: Joseph Howard, DO No Known Allergies Current Immunizations  Reviewed on 9/6/2017 Name Date Influenza High Dose Vaccine PF 9/6/2017 Influenza Vaccine 1/17/2017 Pneumococcal Polysaccharide (PPSV-23) 9/6/2017 Not reviewed this visit You Were Diagnosed With   
  
 Codes Comments Acute bilateral low back pain without sciatica    -  Primary ICD-10-CM: M54.5 ICD-9-CM: 724.2, 338.19 Essential hypertension     ICD-10-CM: I10 
ICD-9-CM: 401.9 Elevated cholesterol     ICD-10-CM: E78.00 ICD-9-CM: 272.0 Initial Medicare annual wellness visit     ICD-10-CM: Z00.00 ICD-9-CM: V70.0 Screening for alcoholism     ICD-10-CM: Z13.89 ICD-9-CM: V79.1 Estrogen deficiency     ICD-10-CM: E28.39 
ICD-9-CM: 256.39 Vitals BP Pulse Temp Resp Height(growth percentile) Weight(growth percentile) 160/80 82 98.2 °F (36.8 °C) (Oral) 18 5' 7\" (1.702 m) 191 lb (86.6 kg) SpO2 BMI OB Status Smoking Status 97% 29.91 kg/m2 Menopause Former Smoker Vitals History BMI and BSA Data Body Mass Index Body Surface Area  
 29.91 kg/m 2 2.02 m 2 Preferred Pharmacy Pharmacy Name Phone [facility] [place], [address] [place] AT [facility] [phone] Your Updated Medication List  
  
   
 This list is accurate as of: 10/11/17 10:42 AM.  Always use your most recent med list.  
  
  
  
  
 apixaban 5 mg tablet Commonly known as:  ELIQUIS  
TAKE 1 TABLET BY MOUTH TWICE DAILY  
  
 diclofenac 1 % Gel Commonly known as:  VOLTAREN Apply 2 g to affected area four (4) times daily. dilTIAZem  mg Tb24 tablet Commonly known as:  CARDIZEM LA Take  by mouth. flecainide 50 mg tablet Commonly known as:  TAMBOCOR Take  by mouth two (2) times a day. lisinopril 10 mg tablet Commonly known as:  PRINIVIL, ZESTRIL  
TAKE 1 TABLET BY MOUTH DAILY Prescriptions Sent to Pharmacy Refills  
 lisinopril (PRINIVIL, ZESTRIL) 10 mg tablet 3 Sig: TAKE 1 TABLET BY MOUTH DAILY Class: Normal  
 Pharmacy: [address] Ph #: [phone]  
 diclofenac (VOLTAREN) 1 % gel 11 Sig: Apply 2 g to affected area four (4) times daily. Class: Normal  
 Pharmacy: [address] Ph #: [phone] Route: Topical  
  
We Performed the Following LIPID PANEL [99615 CPT(R)] METABOLIC PANEL, COMPREHENSIVE [71318 CPT(R)] WA ANNUAL ALCOHOL SCREEN 15 MIN [id number] [facility]] Follow-up Instructions Return if symptoms worsen or fail to improve. To-Do List   
 10/14/2017 Imaging:  DEXA BONE DENSITY STUDY AXIAL Patient Instructions Low Back Pain: Exercises Your Care Instructions Here are some examples of typical rehabilitation exercises for your condition. Start each exercise slowly. Ease off the exercise if you start to have pain. Your doctor or physical therapist will tell you when you can start these exercises and which ones will work best for you. How to do the exercises Press-up 1. Lie on your stomach, supporting your body with your forearms. 2. Press your elbows down into the floor to raise your upper back. As you do this, relax your stomach muscles and allow your back to arch without using your back muscles. As your press up, do not let your hips or pelvis come off the floor. 3. Hold for 15 to 30 seconds, then relax. 4. Repeat 2 to 4 times. Alternate arm and leg (bird dog) exercise Note: Do this exercise slowly. Try to keep your body straight at all times, and do not let one hip drop lower than the other. 1. Start on the floor, on your hands and knees. 2. Tighten your belly muscles. 3. Raise one leg off the floor, and hold it straight out behind you. Be careful not to let your hip drop down, because that will twist your trunk. 4. Hold for about 6 seconds, then lower your leg and switch to the other leg. 5. Repeat 8 to 12 times on each leg. 6. Over time, work up to holding for 10 to 30 seconds each time. 7. If you feel stable and secure with your leg raised, try raising the opposite arm straight out in front of you at the same time. Knee-to-chest exercise 1. Lie on your back with your knees bent and your feet flat on the floor. 2. Bring one knee to your chest, keeping the other foot flat on the floor (or keeping the other leg straight, whichever feels better on your lower back). 3. Keep your lower back pressed to the floor. Hold for at least 15 to 30 seconds. 4. Relax, and lower the knee to the starting position. 5. Repeat with the other leg. Repeat 2 to 4 times with each leg. 6. To get more stretch, put your other leg flat on the floor while pulling your knee to your chest. 
Curl-ups 1. Lie on the floor on your back with your knees bent at a 90-degree angle. Your feet should be flat on the floor, about 12 inches from your buttocks. 2. Cross your arms over your chest. If this bothers your neck, try putting your hands behind your neck (not your head), with your elbows spread apart. 3. Slowly tighten your belly muscles and raise your shoulder blades off the floor. 4. Keep your head in line with your body, and do not press your chin to your chest. 
5. Hold this position for 1 or 2 seconds, then slowly lower yourself back down to the floor. 6. Repeat 8 to 12 times. Pelvic tilt exercise 1. Lie on your back with your knees bent. 2. \"Brace\" your stomach. This means to tighten your muscles by pulling in and imagining your belly button moving toward your spine. You should feel like your back is pressing to the floor and your hips and pelvis are rocking back. 3. Hold for about 6 seconds while you breathe smoothly. 4. Repeat 8 to 12 times. Heel dig bridging 1. Lie on your back with both knees bent and your ankles bent so that only your heels are digging into the floor. Your knees should be bent about 90 degrees. 2. Then push your heels into the floor, squeeze your buttocks, and lift your hips off the floor until your shoulders, hips, and knees are all in a straight line. 3. Hold for about 6 seconds as you continue to breathe normally, and then slowly lower your hips back down to the floor and rest for up to 10 seconds. 4. Do 8 to 12 repetitions. Hamstring stretch in doorway 1. Lie on your back in a doorway, with one leg through the open door. 2. Slide your leg up the wall to straighten your knee. You should feel a gentle stretch down the back of your leg. 3. Hold the stretch for at least 15 to 30 seconds. Do not arch your back, point your toes, or bend either knee. Keep one heel touching the floor and the other heel touching the wall. 4. Repeat with your other leg. 5. Do 2 to 4 times for each leg. Hip flexor stretch 1. Kneel on the floor with one knee bent and one leg behind you. Place your forward knee over your foot. Keep your other knee touching the floor. 2. Slowly push your hips forward until you feel a stretch in the upper thigh of your rear leg. 3. Hold the stretch for at least 15 to 30 seconds. Repeat with your other leg. 4. Do 2 to 4 times on each side. Wall sit 1. Stand with your back 10 to 12 inches away from a wall. 2. Lean into the wall until your back is flat against it. 3. Slowly slide down until your knees are slightly bent, pressing your lower back into the wall. 4. Hold for about 6 seconds, then slide back up the wall. 5. Repeat 8 to 12 times. Follow-up care is a key part of your treatment and safety. Be sure to make and go to all appointments, and call your doctor if you are having problems. It's also a good idea to know your test results and keep a list of the medicines you take. Where can you learn more? Go to http://yuly-jaime.info/. Enter Q092 in the search box to learn more about \"Low Back Pain: Exercises. \" Current as of: March 21, 2017 Content Version: 11.3 © 4924-1386 [employer]. Care instructions adapted under license by [employer] (which disclaims liability or warranty for this information). If you have questions about a medical condition or this instruction, always ask your healthcare professional. [place] [address] any warranty or liability for your use of this information. Medicare Wellness Visit, Female The best way to live healthy is to have a healthy lifestyle by eating a well-balanced diet, exercising regularly, limiting alcohol and stopping smoking. Regular physical exams and screening tests are another way to keep healthy. Preventive exams provided by your health care provider can find health problems before they become diseases or illnesses. Preventive services including immunizations, screening tests, monitoring and exams can help you take care of your own health. All people over age 72 should have a pneumovax  and and a prevnar shot to prevent pneumonia. These are once in a lifetime unless you and your provider decide differently. All people over 65 should have a yearly flu shot and a tetanus vaccine every 10 years. A bone mass density to screen for osteoporosis or thinning of the bones should be done every 2 years after 65. Screening for diabetes mellitus with a blood sugar test should be done every year. Glaucoma is a disease of the eye due to increased ocular pressure that can lead to blindness and it should be done every year by an eye professional. 
 
Cardiovascular screening tests that check for elevated lipids (fatty part of blood) which can lead to heart disease and strokes should be done every 5 years. Colorectal screening that evaluates for blood or polyps in your colon should be done yearly as a stool test or every five years as a flexible sigmoidoscope or every 10 years as a colonoscopy up to age 76. Breast cancer screening with a mammogram is recommended biennially  for women age 54-69. Screening for cervical cancer with a pap smear and pelvic exam is recommended for women after age 72 years every 2 years up to age 79 or when the provider and patient decide to stop. If there is a history of cervical abnormalities or other increased risk for cancer then the test is recommended yearly. Hepatitis C screening is also recommended for anyone born between [birth year] through [address]. A shingles vaccine is also recommended once in a lifetime after age 61. Your Medicare Wellness Exam is recommended annually. Here is a list of your current Health Maintenance items with a due date: 
Health Maintenance Due Topic Date Due  
 DTaP/Tdap/Td  (1 - Tdap) 03/14/1962  Shingles Vaccine  01/14/2001  Glaucoma Screening   03/14/2006  Bone Density Screening  03/14/2006 [facility] Annual Well Visit  03/14/2006 Medicare Wellness Visit, Female The best way to live healthy is to have a healthy lifestyle by eating a well-balanced diet, exercising regularly, limiting alcohol and stopping smoking. Regular physical exams and screening tests are another way to keep healthy. Preventive exams provided by your health care provider can find health problems before they become diseases or illnesses. Preventive services including immunizations, screening tests, monitoring and exams can help you take care of your own health. All people over age 72 should have a pneumovax  and and a prevnar shot to prevent pneumonia. These are once in a lifetime unless you and your provider decide differently. All people over 65 should have a yearly flu shot and a tetanus vaccine every 10 years. A bone mass density to screen for osteoporosis or thinning of the bones should be done every 2 years after 65. Screening for diabetes mellitus with a blood sugar test should be done every year. Glaucoma is a disease of the eye due to increased ocular pressure that can lead to blindness and it should be done every year by an eye professional. 
 
Cardiovascular screening tests that check for elevated lipids (fatty part of blood) which can lead to heart disease and strokes should be done every 5 years. Colorectal screening that evaluates for blood or polyps in your colon should be done yearly as a stool test or every five years as a flexible sigmoidoscope or every 10 years as a colonoscopy up to age 76. Breast cancer screening with a mammogram is recommended biennially  for women age 54-69. Screening for cervical cancer with a pap smear and pelvic exam is recommended for women after age 72 years every 2 years up to age 79 or when the provider and patient decide to stop. If there is a history of cervical abnormalities or other increased risk for cancer then the test is recommended yearly. Hepatitis C screening is also recommended for anyone born between [birth year] through [address]. A shingles vaccine is also recommended once in a lifetime after age 61. Your Medicare Wellness Exam is recommended annually. Here is a list of your current Health Maintenance items with a due date: 
Health Maintenance Due Topic Date Due  
 DTaP/Tdap/Td  (1 - Tdap) 03/14/1962  Shingles Vaccine  01/14/2001  Glaucoma Screening   03/14/2006  Bone Density Screening  03/14/2006 Justin Dupont Annual Well Visit  03/14/2006 Introducing [facility]! Alethea Toussaint introduces Bottlenose patient portal. Now you can access parts of your medical record, email your doctor's office, and request medication refills online. 1. In your internet browser, go to [email or web address] 2. Click on the First Time User? Click Here link in the Sign In box. You will see the New Member Sign Up page. 3. Enter your Bottlenose Access Code exactly as it appears below. You will not need to use this code after youve completed the sign-up process. If you do not sign up before the expiration date, you must request a new code. · Bottlenose Access Code: [email or web address] Expires: 12/5/2017 10:13 AM 
 
4. Enter the last four digits of your Social Security Number (xxxx) and Date of Birth (mm/dd/yyyy) as indicated and click Submit. You will be taken to the next sign-up page. 5. Create a Bottlenose ID. This will be your Bottlenose login ID and cannot be changed, so think of one that is secure and easy to remember. 6. Create a Bottlenose password. You can change your password at any time. 7. Enter your Password Reset Question and Answer. This can be used at a later time if you forget your password. 8. Enter your e-mail address. You will receive e-mail notification when new information is available in [address] 9. Click Sign Up. You can now view and download portions of your medical record. 10. Click the Download Summary menu link to download a portable copy of your medical information. If you have questions, please visit the Frequently Asked Questions section of the Bottlenose website.  Remember, [employer]ose is NOT to be used for urgent needs. For medical emergencies, dial 911. Now available from your iPhone and Android! Please provide this summary of care documentation to your next provider. Your primary care clinician is listed as Jenise Carlos. If you have any questions after today's visit, please call [phone].

## 2017-10-11 NOTE — PROGRESS NOTES
Jaymie Salazar is a 69 y.o. female   Chief Complaint   Patient presents with    Labs    Medication Refill    pt here for recheck of her lipids and her bp med needs refil lwhich she is toelrating fine. Pt also with low abck pain and the more she uses it the more it bothers her. Used to use aleve but can not use this on eliquis, denies sciatica symptoms. Would like exercises for this and discussed voltaren gel. Requesting handicap placard walks with rusty osunaleted and given to pt.  she is a 69y.o. year old female who presents for evalution. Reviewed PmHx, RxHx, FmHx, SocHx, AllgHx and updated and dated in the chart. Review of Systems - negative except as listed above in the HPI    Objective:     Vitals:    10/11/17 1015   BP: 160/80   Pulse: 82   Resp: 18   Temp: 98.2 °F (36.8 °C)   TempSrc: Oral   SpO2: 97%   Weight: 191 lb (86.6 kg)   Height: 5' 7\" (1.702 m)       Current Outpatient Prescriptions   Medication Sig    lisinopril (PRINIVIL, ZESTRIL) 10 mg tablet TAKE 1 TABLET BY MOUTH DAILY    diclofenac (VOLTAREN) 1 % gel Apply 2 g to affected area four (4) times daily.  apixaban (ELIQUIS) 5 mg tablet TAKE 1 TABLET BY MOUTH TWICE DAILY    diltiazem hcl 240 mg Tb24 Take  by mouth.  flecainide (TAMBOCOR) 50 mg tablet Take  by mouth two (2) times a day. No current facility-administered medications for this visit.         Physical Examination: General appearance - alert, well appearing, and in no distress  Mental status - alert, oriented to person, place, and time  Eyes - pupils equal and reactive, extraocular eye movements intact  Ears - bilateral TM's and external ear canals normal  Mouth - mucous membranes moist, pharynx normal without lesions  Neck - supple, no significant adenopathy  Lymphatics - no palpable lymphadenopathy, no hepatosplenomegaly  Chest - clear to auscultation, no wheezes, rales or rhonchi, symmetric air entry  Heart - normal rate, regular rhythm, normal S1, S2, no murmurs, rubs, clicks or gallops  Abdomen - soft, nontender, nondistended, no masses or organomegaly  Back exam - full range of motion, no tenderness, palpable spasm or pain on motion  Neurological - alert, oriented, normal speech, no focal findings or movement disorder noted  Musculoskeletal - no joint tenderness, deformity or swelling  Extremities - peripheral pulses normal, no pedal edema, no clubbing or cyanosis  Skin - normal coloration and turgor, no rashes, no suspicious skin lesions noted      Assessment/ Plan:   Diagnoses and all orders for this visit:    1. Acute bilateral low back pain without sciatica  -     diclofenac (VOLTAREN) 1 % gel; Apply 2 g to affected area four (4) times daily. 2. Essential hypertension  -     lisinopril (PRINIVIL, ZESTRIL) 10 mg tablet; TAKE 1 TABLET BY MOUTH DAILY    3. Elevated cholesterol  -     METABOLIC PANEL, COMPREHENSIVE  -     LIPID PANEL    4. Initial Medicare annual wellness visit    5. Screening for alcoholism  -     Annual  Alcohol Screen 15 min ()    6. Estrogen deficiency  -     Dexa Bone Density Study Axial (SMF6609); Future       Follow-up Disposition:  Return if symptoms worsen or fail to improve. I have discussed the diagnosis with the patient and the intended plan as seen in the above orders. The patient has received an after-visit summary and questions were answered concerning future plans. Pt conveyed understanding of plan. Medication Side Effects and Warnings were discussed with patient      Chad Noble, DO       This is an Initial Medicare Annual Wellness Exam (AWV) (Performed 12 months after IPPE or effective date of Medicare Part B enrollment, Once in a lifetime)    I have reviewed the patient's medical history in detail and updated the computerized patient record. History     Past Medical History:   Diagnosis Date    A-fib [facility])       History reviewed. No pertinent surgical history.   Current Outpatient Prescriptions   Medication Sig Dispense Refill    lisinopril (PRINIVIL, ZESTRIL) 10 mg tablet TAKE 1 TABLET BY MOUTH DAILY 90 Tab 3    diclofenac (VOLTAREN) 1 % gel Apply 2 g to affected area four (4) times daily. 100 g 11    apixaban (ELIQUIS) 5 mg tablet TAKE 1 TABLET BY MOUTH TWICE DAILY 180 Tab 3    diltiazem hcl 240 mg Tb24 Take  by mouth.  flecainide (TAMBOCOR) 50 mg tablet Take  by mouth two (2) times a day. No Known Allergies  Family History   Problem Relation Age of Onset    Heart Disease Mother      Social History   Substance Use Topics    Smoking status: Former Smoker    Smokeless tobacco: Never Used    Alcohol use No     Patient Active Problem List   Diagnosis Code    Chronic atrial fibrillation (HCC) I48.2       Depression Risk Factor Screening:     PHQ over the last two weeks 10/11/2017   Little interest or pleasure in doing things Not at all   Feeling down, depressed or hopeless Not at all   Total Score PHQ 2 0     Alcohol Risk Factor Screening: You do not drink alcohol or very rarely. Functional Ability and Level of Safety:     Hearing Loss  Hearing is good. Activities of Daily Living  The home contains: [place]  Patient does total self care    Fall Risk  Fall Risk Assessment, last 12 mths 10/11/2017   Able to walk? Yes   Fall in past 12 months? No       Abuse Screen  Patient is not abused    Cognitive Screening   Evaluation of Cognitive Function:  Has your family/caregiver stated any concerns about your memory: no  Normal    Patient Care Team   Patient Care Team:  Jenise Carlos DO as PCP - General (Family Practice)    Assessment/Plan   Education and counseling provided:  Are appropriate based on today's review and evaluation  End-of-Life planning (with patient's consent)    Diagnoses and all orders for this visit:    1. Acute bilateral low back pain without sciatica  -     diclofenac (VOLTAREN) 1 % gel; Apply 2 g to affected area four (4) times daily.     2. Essential hypertension  - lisinopril (PRINIVIL, ZESTRIL) 10 mg tablet; TAKE 1 TABLET BY MOUTH DAILY    3. Elevated cholesterol  -     METABOLIC PANEL, COMPREHENSIVE  -     LIPID PANEL    4. Initial Medicare annual wellness visit    5. Screening for alcoholism  -     Annual  Alcohol Screen 15 min ()    6. Estrogen deficiency  -     Dexa Bone Density Study Axial ([id number]); Future         Health Maintenance Due   Topic Date Due    DTaP/Tdap/Td series (1 - Tdap) 03/14/1962    ZOSTER VACCINE AGE 60>  01/14/2001    GLAUCOMA SCREENING Q2Y  03/14/2006    OSTEOPOROSIS SCREENING (DEXA)  03/14/2006    MEDICARE YEARLY EXAM  03/14/2006     I have discussed the diagnosis with the patient and the intended plan as seen in the above orders. The patient has received an after-visit summary and questions were answered concerning future plans. Pt conveyed understanding of plan.       Dr Cody Christianson

## 2017-10-11 NOTE — PATIENT INSTRUCTIONS
Low Back Pain: Exercises  Your Care Instructions  Here are some examples of typical rehabilitation exercises for your condition. Start each exercise slowly. Ease off the exercise if you start to have pain. Your doctor or physical therapist will tell you when you can start these exercises and which ones will work best for you. How to do the exercises  Press-up    1. Lie on your stomach, supporting your body with your forearms. 2. Press your elbows down into the floor to raise your upper back. As you do this, relax your stomach muscles and allow your back to arch without using your back muscles. As your press up, do not let your hips or pelvis come off the floor. 3. Hold for 15 to 30 seconds, then relax. 4. Repeat 2 to 4 times. Alternate arm and leg (bird dog) exercise    Note: Do this exercise slowly. Try to keep your body straight at all times, and do not let one hip drop lower than the other. 1. Start on the floor, on your hands and knees. 2. Tighten your belly muscles. 3. Raise one leg off the floor, and hold it straight out behind you. Be careful not to let your hip drop down, because that will twist your trunk. 4. Hold for about 6 seconds, then lower your leg and switch to the other leg. 5. Repeat 8 to 12 times on each leg. 6. Over time, work up to holding for 10 to 30 seconds each time. 7. If you feel stable and secure with your leg raised, try raising the opposite arm straight out in front of you at the same time. Knee-to-chest exercise    1. Lie on your back with your knees bent and your feet flat on the floor. 2. Bring one knee to your chest, keeping the other foot flat on the floor (or keeping the other leg straight, whichever feels better on your lower back). 3. Keep your lower back pressed to the floor. Hold for at least 15 to 30 seconds. 4. Relax, and lower the knee to the starting position. 5. Repeat with the other leg. Repeat 2 to 4 times with each leg.   6. To get more stretch, put your other leg flat on the floor while pulling your knee to your chest.  Curl-ups    1. Lie on the floor on your back with your knees bent at a 90-degree angle. Your feet should be flat on the floor, about 12 inches from your buttocks. 2. Cross your arms over your chest. If this bothers your neck, try putting your hands behind your neck (not your head), with your elbows spread apart. 3. Slowly tighten your belly muscles and raise your shoulder blades off the floor. 4. Keep your head in line with your body, and do not press your chin to your chest.  5. Hold this position for 1 or 2 seconds, then slowly lower yourself back down to the floor. 6. Repeat 8 to 12 times. Pelvic tilt exercise    1. Lie on your back with your knees bent. 2. \"Brace\" your stomach. This means to tighten your muscles by pulling in and imagining your belly button moving toward your spine. You should feel like your back is pressing to the floor and your hips and pelvis are rocking back. 3. Hold for about 6 seconds while you breathe smoothly. 4. Repeat 8 to 12 times. Heel dig bridging    1. Lie on your back with both knees bent and your ankles bent so that only your heels are digging into the floor. Your knees should be bent about 90 degrees. 2. Then push your heels into the floor, squeeze your buttocks, and lift your hips off the floor until your shoulders, hips, and knees are all in a straight line. 3. Hold for about 6 seconds as you continue to breathe normally, and then slowly lower your hips back down to the floor and rest for up to 10 seconds. 4. Do 8 to 12 repetitions. Hamstring stretch in doorway    1. Lie on your back in a doorway, with one leg through the open door. 2. Slide your leg up the wall to straighten your knee. You should feel a gentle stretch down the back of your leg. 3. Hold the stretch for at least 15 to 30 seconds. Do not arch your back, point your toes, or bend either knee.  Keep one heel touching the floor and the other heel touching the wall. 4. Repeat with your other leg. 5. Do 2 to 4 times for each leg. Hip flexor stretch    1. Kneel on the floor with one knee bent and one leg behind you. Place your forward knee over your foot. Keep your other knee touching the floor. 2. Slowly push your hips forward until you feel a stretch in the upper thigh of your rear leg. 3. Hold the stretch for at least 15 to 30 seconds. Repeat with your other leg. 4. Do 2 to 4 times on each side. Wall sit    1. Stand with your back 10 to 12 inches away from a wall. 2. Lean into the wall until your back is flat against it. 3. Slowly slide down until your knees are slightly bent, pressing your lower back into the wall. 4. Hold for about 6 seconds, then slide back up the wall. 5. Repeat 8 to 12 times. Follow-up care is a key part of your treatment and safety. Be sure to make and go to all appointments, and call your doctor if you are having problems. It's also a good idea to know your test results and keep a list of the medicines you take. Where can you learn more? Go to http://[employer].info/. Enter L822 in the search box to learn more about \"Low Back Pain: Exercises. \"  Current as of: March 21, 2017  Content Version: 11.3  © 3243-4985 [employer]. Care instructions adapted under license by [employer] (which disclaims liability or warranty for this information). If you have questions about a medical condition or this instruction, always ask your healthcare professional. [place] [address] any warranty or liability for your use of this information. Medicare Wellness Visit, Female    The best way to live healthy is to have a healthy lifestyle by eating a well-balanced diet, exercising regularly, limiting alcohol and stopping smoking. Regular physical exams and screening tests are another way to keep healthy.  Preventive exams provided by your health care provider can find health problems before they become diseases or illnesses. Preventive services including immunizations, screening tests, monitoring and exams can help you take care of your own health. All people over age 72 should have a pneumovax  and and a prevnar shot to prevent pneumonia. These are once in a lifetime unless you and your provider decide differently. All people over 65 should have a yearly flu shot and a tetanus vaccine every 10 years. A bone mass density to screen for osteoporosis or thinning of the bones should be done every 2 years after 65. Screening for diabetes mellitus with a blood sugar test should be done every year. Glaucoma is a disease of the eye due to increased ocular pressure that can lead to blindness and it should be done every year by an eye professional.    Cardiovascular screening tests that check for elevated lipids (fatty part of blood) which can lead to heart disease and strokes should be done every 5 years. Colorectal screening that evaluates for blood or polyps in your colon should be done yearly as a stool test or every five years as a flexible sigmoidoscope or every 10 years as a colonoscopy up to age 76. Breast cancer screening with a mammogram is recommended biennially  for women age 54-69. Screening for cervical cancer with a pap smear and pelvic exam is recommended for women after age 72 years every 2 years up to age 79 or when the provider and patient decide to stop. If there is a history of cervical abnormalities or other increased risk for cancer then the test is recommended yearly. Hepatitis C screening is also recommended for anyone born between [birth year] through [address]. A shingles vaccine is also recommended once in a lifetime after age 61. Your Medicare Wellness Exam is recommended annually.     Here is a list of your current Health Maintenance items with a due date:  Health Maintenance Due   Topic Date Due    DTaP/Tdap/Td  (1 - Tdap) 03/14/1962    Shingles Vaccine  01/14/2001    Glaucoma Screening   03/14/2006    Bone Density Screening  03/14/2006    Annual Well Visit  03/14/2006       Medicare Wellness Visit, Female    The best way to live healthy is to have a healthy lifestyle by eating a well-balanced diet, exercising regularly, limiting alcohol and stopping smoking. Regular physical exams and screening tests are another way to keep healthy. Preventive exams provided by your health care provider can find health problems before they become diseases or illnesses. Preventive services including immunizations, screening tests, monitoring and exams can help you take care of your own health. All people over age 72 should have a pneumovax  and and a prevnar shot to prevent pneumonia. These are once in a lifetime unless you and your provider decide differently. All people over 65 should have a yearly flu shot and a tetanus vaccine every 10 years. A bone mass density to screen for osteoporosis or thinning of the bones should be done every 2 years after 65. Screening for diabetes mellitus with a blood sugar test should be done every year. Glaucoma is a disease of the eye due to increased ocular pressure that can lead to blindness and it should be done every year by an eye professional.    Cardiovascular screening tests that check for elevated lipids (fatty part of blood) which can lead to heart disease and strokes should be done every 5 years. Colorectal screening that evaluates for blood or polyps in your colon should be done yearly as a stool test or every five years as a flexible sigmoidoscope or every 10 years as a colonoscopy up to age 76. Breast cancer screening with a mammogram is recommended biennially  for women age 54-69. Screening for cervical cancer with a pap smear and pelvic exam is recommended for women after age 72 years every 2 years up to age 79 or when the provider and patient decide to stop. If there is a history of cervical abnormalities or other increased risk for cancer then the test is recommended yearly. Hepatitis C screening is also recommended for anyone born between [birth year] through [address]. A shingles vaccine is also recommended once in a lifetime after age 61. Your Medicare Wellness Exam is recommended annually.     Here is a list of your current Health Maintenance items with a due date:  Health Maintenance Due   Topic Date Due    DTaP/Tdap/Td  (1 - Tdap) 03/14/1962    Shingles Vaccine  01/14/2001    Glaucoma Screening   03/14/2006    Bone Density Screening  03/14/2006    Annual Well Visit  03/14/2006

## 2017-10-12 LAB — SPECIMEN STATUS REPORT, ROLRST: NORMAL

## 2017-10-13 NOTE — PROGRESS NOTES
Cholesterol is elevated more so than previous please work on a healthier diet so we can avoid medication and we can recheck in 3 months.   Pt also with elevated glucose and I will add on A1C for diabetes marker, will result once back

## 2017-10-14 LAB
ALBUMIN SERPL-MCNC: 4.1 G/DL (ref 3.5–4.8)
ALBUMIN/GLOB SERPL: 1.1 {RATIO} (ref 1.2–2.2)
ALP SERPL-CCNC: 142 IU/L (ref 39–117)
ALT SERPL-CCNC: 16 IU/L (ref 0–32)
AST SERPL-CCNC: 19 IU/L (ref 0–40)
BILIRUB SERPL-MCNC: 0.5 MG/DL (ref 0–1.2)
BUN SERPL-MCNC: 17 MG/DL (ref 8–27)
BUN/CREAT SERPL: 25 (ref 12–28)
CALCIUM SERPL-MCNC: 9.8 MG/DL (ref 8.7–10.3)
CHLORIDE SERPL-SCNC: 100 MMOL/L (ref 96–106)
CHOLEST SERPL-MCNC: 215 MG/DL (ref 100–199)
CO2 SERPL-SCNC: 25 MMOL/L (ref 18–29)
CREAT SERPL-MCNC: 0.67 MG/DL (ref 0.57–1)
GLOBULIN SER CALC-MCNC: 3.9 G/DL (ref 1.5–4.5)
GLUCOSE SERPL-MCNC: 105 MG/DL (ref 65–99)
HDLC SERPL-MCNC: 48 MG/DL
INTERPRETATION, 910389: NORMAL
LDLC SERPL CALC-MCNC: 140 MG/DL (ref 0–99)
POTASSIUM SERPL-SCNC: 5.1 MMOL/L (ref 3.5–5.2)
PROT SERPL-MCNC: 8 G/DL (ref 6–8.5)
SODIUM SERPL-SCNC: 139 MMOL/L (ref 134–144)
TRIGL SERPL-MCNC: 136 MG/DL (ref 0–149)
VLDLC SERPL CALC-MCNC: 27 MG/DL (ref 5–40)

## 2017-10-16 ENCOUNTER — TELEPHONE (OUTPATIENT)
Dept: FAMILY MEDICINE CLINIC | Age: 77
End: 2017-10-16

## 2017-10-17 LAB
EST. AVERAGE GLUCOSE BLD GHB EST-MCNC: 120 MG/DL
HBA1C MFR BLD: 5.8 % (ref 4.8–5.6)
SPECIMEN STATUS REPORT, ROLRST: NORMAL

## 2017-10-17 NOTE — PROGRESS NOTES
Diabetes marker came back showing beginning of prediabetes, decrease carbs/sweets and we should recheck 6 months

## 2017-10-18 NOTE — PROGRESS NOTES
ID verified X 3 - informed pt of Dr. Heavenly Gutiérrez note of prediabetes & watch carbs/sweets & ruth in 6 mos. Voiced understanding & appointment made.

## 2017-10-26 ENCOUNTER — HOSPITAL ENCOUNTER (OUTPATIENT)
Dept: MAMMOGRAPHY | Age: 77
Discharge: HOME | End: 2017-10-26
Attending: FAMILY MEDICINE
Payer: MEDICARE

## 2017-10-26 DIAGNOSIS — E28.39 ESTROGEN DEFICIENCY: ICD-10-CM

## 2017-10-26 PROCEDURE — 77080 DXA BONE DENSITY AXIAL: CPT

## 2017-10-26 NOTE — PROGRESS NOTES
Pt has osteoporosis, recommend prolia which is once every six month injection.   If willing to start will send to Dr Lucas Zeng for this, she has an infusion center in [place] [id number]

## 2017-10-27 NOTE — PROGRESS NOTES
Can you Contact [facility] at [phone] and see how we can get her set up for prolia injections?   They are an infusion center for prolia

## 2018-01-16 ENCOUNTER — OFFICE VISIT (OUTPATIENT)
Dept: FAMILY MEDICINE CLINIC | Age: 78
End: 2018-01-16

## 2018-01-16 VITALS
BODY MASS INDEX: 29.6 KG/M2 | OXYGEN SATURATION: 98 % | TEMPERATURE: 98.2 F | HEART RATE: 79 BPM | RESPIRATION RATE: 18 BRPM | DIASTOLIC BLOOD PRESSURE: 80 MMHG | HEIGHT: 67 IN | SYSTOLIC BLOOD PRESSURE: 122 MMHG | WEIGHT: 188.6 LBS

## 2018-01-16 DIAGNOSIS — E78.00 ELEVATED CHOLESTEROL: Primary | ICD-10-CM

## 2018-01-16 DIAGNOSIS — I10 ESSENTIAL HYPERTENSION: ICD-10-CM

## 2018-01-16 DIAGNOSIS — R73.01 ELEVATED FASTING GLUCOSE: ICD-10-CM

## 2018-01-16 DIAGNOSIS — I35.0 NONRHEUMATIC AORTIC VALVE STENOSIS: ICD-10-CM

## 2018-01-16 NOTE — MR AVS SNAPSHOT
[address] [address] 
[phone] Patient: Hina Tavarez MRN: [id number] ZZT:5/24/7346 Visit Information Date & Time Provider Department Dept. Phone Encounter #  
 1/16/2018 10:00 AM Daniel GraceJessica [phone] [id number] Follow-up Instructions Return in about 6 months (around 7/16/2018), or if symptoms worsen or fail to improve. Upcoming Health Maintenance Date Due ZOSTER VACCINE AGE 60> 1/14/2001 GLAUCOMA SCREENING Q2Y 3/14/2006 Pneumococcal 65+ Low/Medium Risk (2 of 2 - PCV13) 9/6/2018 MEDICARE YEARLY EXAM 10/12/2018 DTaP/Tdap/Td series (2 - Td) 1/16/2028 Allergies as of 1/16/2018  Review Complete On: 1/16/2018 By: Daniel Grace, DO No Known Allergies Current Immunizations  Reviewed on 9/6/2017 Name Date Influenza High Dose Vaccine PF 9/6/2017 Influenza Vaccine 1/17/2017 Pneumococcal Polysaccharide (PPSV-23) 9/6/2017 Not reviewed this visit You Were Diagnosed With   
  
 Codes Comments Elevated cholesterol    -  Primary ICD-10-CM: E78.00 ICD-9-CM: 272.0 Elevated fasting glucose     ICD-10-CM: R73.01 
ICD-9-CM: 790.21 Essential hypertension     ICD-10-CM: I10 
ICD-9-CM: 401.9 Nonrheumatic aortic valve stenosis     ICD-10-CM: I35.0 ICD-9-CM: 424.1 Vitals BP Pulse Temp Resp Height(growth percentile) Weight(growth percentile) 122/80 79 98.2 °F (36.8 °C) (Oral) 18 5' 7\" (1.702 m) 188 lb 9.6 oz (85.5 kg) SpO2 BMI OB Status Smoking Status 98% 29.54 kg/m2 Menopause Former Smoker Vitals History BMI and BSA Data Body Mass Index Body Surface Area  
 29.54 kg/m 2 2.01 m 2 Preferred Pharmacy Pharmacy Name Phone [facility] [place], [address] [place] AT [facility] [phone] Your Updated Medication List  
  
   
 This list is accurate as of: 1/16/18 10:30 AM.  Always use your most recent med list.  
  
  
  
  
 apixaban 5 mg tablet Commonly known as:  ELIQUIS  
TAKE 1 TABLET BY MOUTH TWICE DAILY  
  
 diclofenac 1 % Gel Commonly known as:  VOLTAREN Apply 2 g to affected area four (4) times daily. dilTIAZem  mg Tb24 tablet Commonly known as:  CARDIZEM LA Take  by mouth.  
  
 lisinopril 10 mg tablet Commonly known as:  PRINIVIL, ZESTRIL  
TAKE 1 TABLET BY MOUTH DAILY We Performed the Following HEMOGLOBIN A1C WITH EAG [91908 CPT(R)] LIPID PANEL [47728 CPT(R)] METABOLIC PANEL, COMPREHENSIVE [39548 CPT(R)] Follow-up Instructions Return in about 6 months (around 7/16/2018), or if symptoms worsen or fail to improve. Patient Instructions Prediabetes: Care Instructions Your Care Instructions Prediabetes is a warning sign that you are at risk for getting type 2 diabetes. It means that your blood sugar is higher than it should be. The food you eat turns into sugar, which your body uses for energy. Normally, an organ called the pancreas makes insulin, which allows the sugar in your blood to get into your body's cells. But when your body can't use insulin the right way, the sugar doesn't move into cells. It stays in your blood instead. This is called insulin resistance. The buildup of sugar in the blood causes prediabetes. The good news is that lifestyle changes may help you get your blood sugar back to normal and help you avoid or delay diabetes. Follow-up care is a key part of your treatment and safety. Be sure to make and go to all appointments, and call your doctor if you are having problems. It's also a good idea to know your test results and keep a list of the medicines you take. How can you care for yourself at home? · Watch your weight. A healthy weight helps your body use insulin properly. · Limit the amount of calories, sweets, and unhealthy fat you eat. Ask your doctor if you should see a dietitian. A registered dietitian can help you create meal plans that fit your lifestyle. · Get at least 30 minutes of exercise on most days of the week. Exercise helps control your blood sugar. It also helps you maintain a healthy weight. Walking is a good choice. You also may want to do other activities, such as running, swimming, cycling, or playing tennis or team sports. · Do not smoke. Smoking can make prediabetes worse. If you need help quitting, talk to your doctor about stop-smoking programs and medicines. These can increase your chances of quitting for good. · If your doctor prescribed medicines, take them exactly as prescribed. Call your doctor if you think you are having a problem with your medicine. You will get more details on the specific medicines your doctor prescribes. When should you call for help? Watch closely for changes in your health, and be sure to contact your doctor if: 
? · You have any symptoms of diabetes. These may include: ¨ Being thirsty more often. ¨ Urinating more. ¨ Being hungrier. ¨ Losing weight. ¨ Being very tired. ¨ Having blurry vision. ? · You have a wound that will not heal.  
? · You have an infection that will not go away. ? · You have problems with your blood pressure. ? · You want more information about diabetes and how you can keep from getting it. Where can you learn more? Go to http://yuly-jaime.info/. Enter I222 in the search box to learn more about \"Prediabetes: Care Instructions. \" Current as of: March 13, 2017 Content Version: 11.4 © 6653-1621 [employer] Technologies. Care instructions adapted under license by [employer] (which disclaims liability or warranty for this information).  If you have questions about a medical condition or this instruction, always ask your healthcare professional. Prasad Goncalves[employer] disclaims any warranty or liability for your use of this information. Introducing [facility]! [facility] introduces AIFOTEC patient portal. Now you can access parts of your medical record, email your doctor's office, and request medication refills online. 1. In your internet browser, go to [email or web address] 2. Click on the First Time User? Click Here link in the Sign In box. You will see the New Member Sign Up page. 3. Enter your AIFOTEC Access Code exactly as it appears below. You will not need to use this code after youve completed the sign-up process. If you do not sign up before the expiration date, you must request a new code. · AIFOTEC Access Code: [email or web address] Expires: 4/16/2018 10:30 AM 
 
4. Enter the last four digits of your Social Security Number (xxxx) and Date of Birth (mm/dd/yyyy) as indicated and click Submit. You will be taken to the next sign-up page. 5. Create a AIFOTEC ID. This will be your AIFOTEC login ID and cannot be changed, so think of one that is secure and easy to remember. 6. Create a AIFOTEC password. You can change your password at any time. 7. Enter your Password Reset Question and Answer. This can be used at a later time if you forget your password. 8. Enter your e-mail address. You will receive e-mail notification when new information is available in [address] 9. Click Sign Up. You can now view and download portions of your medical record. 10. Click the Download Summary menu link to download a portable copy of your medical information. If you have questions, please visit the Frequently Asked Questions section of the AIFOTEC website. Remember, AIFOTEC is NOT to be used for urgent needs. For medical emergencies, dial 911. Now available from your iPhone and Android! Please provide this summary of care documentation to your next provider. Your primary care clinician is listed as Kevin Cantrell. If you have any questions after today's visit, please call [phone].

## 2018-01-16 NOTE — PROGRESS NOTES
Lyly Rea is a 69 y.o. female   Chief Complaint   Patient presents with    Labs    pt here for recheck of fasting labs. Last A1C of 5.8 and states her diet has improved some but could be better. Pt also had an elevation of her lipids on last check. Pt has been avoiding sweets but does eat a lot of carbs, likes potatoes. HTN remains well controlled on medication. Pt has gotten an air fryer and plans to use this more to avoid deep frying foods. Pt is followed by cardiology and states had an echo on friday  she is a 69y.o. year old female who presents for evalution. Reviewed PmHx, RxHx, FmHx, SocHx, AllgHx and updated and dated in the chart. Review of Systems - negative except as listed above in the HPI    Objective:     Vitals:    01/16/18 1010   BP: 122/80   Pulse: 79   Resp: 18   Temp: 98.2 °F (36.8 °C)   TempSrc: Oral   SpO2: 98%   Weight: 188 lb 9.6 oz (85.5 kg)   Height: 5' 7\" (1.702 m)       Current Outpatient Prescriptions   Medication Sig    lisinopril (PRINIVIL, ZESTRIL) 10 mg tablet TAKE 1 TABLET BY MOUTH DAILY    diclofenac (VOLTAREN) 1 % gel Apply 2 g to affected area four (4) times daily.  apixaban (ELIQUIS) 5 mg tablet TAKE 1 TABLET BY MOUTH TWICE DAILY    diltiazem hcl 240 mg Tb24 Take  by mouth. No current facility-administered medications for this visit. Physical Examination: General appearance - alert, well appearing, and in no distress  Mental status - alert, oriented to person, place, and time  Eyes - pupils equal and reactive, extraocular eye movements intact  Chest - clear to auscultation, no wheezes, rales or rhonchi, symmetric air entry  Heart - systolic murmur 2/6 throughout the precordium  Abdomen - soft, nontender, nondistended, no masses or organomegaly      Assessment/ Plan:   Diagnoses and all orders for this visit:    1. Elevated cholesterol  -     METABOLIC PANEL, COMPREHENSIVE  -     LIPID PANEL    2.  Elevated fasting glucose  -     HEMOGLOBIN A1C WITH EAG    3. Essential hypertension  -     METABOLIC PANEL, COMPREHENSIVE    4. Nonrheumatic aortic valve stenosis     pt willing to start a statin if Chol remains elevated  Follow-up Disposition:  Return in about 6 months (around 7/16/2018), or if symptoms worsen or fail to improve. I have discussed the diagnosis with the patient and the intended plan as seen in the above orders. The patient has received an after-visit summary and questions were answered concerning future plans. Pt conveyed understanding of plan.     Medication Side Effects and Warnings were discussed with patient      Bhavna Zepeda, DO

## 2018-01-16 NOTE — PATIENT INSTRUCTIONS
Prediabetes: Care Instructions  Your Care Instructions    Prediabetes is a warning sign that you are at risk for getting type 2 diabetes. It means that your blood sugar is higher than it should be. The food you eat turns into sugar, which your body uses for energy. Normally, an organ called the pancreas makes insulin, which allows the sugar in your blood to get into your body's cells. But when your body can't use insulin the right way, the sugar doesn't move into cells. It stays in your blood instead. This is called insulin resistance. The buildup of sugar in the blood causes prediabetes. The good news is that lifestyle changes may help you get your blood sugar back to normal and help you avoid or delay diabetes. Follow-up care is a key part of your treatment and safety. Be sure to make and go to all appointments, and call your doctor if you are having problems. It's also a good idea to know your test results and keep a list of the medicines you take. How can you care for yourself at home? · Watch your weight. A healthy weight helps your body use insulin properly. · Limit the amount of calories, sweets, and unhealthy fat you eat. Ask your doctor if you should see a dietitian. A registered dietitian can help you create meal plans that fit your lifestyle. · Get at least 30 minutes of exercise on most days of the week. Exercise helps control your blood sugar. It also helps you maintain a healthy weight. Walking is a good choice. You also may want to do other activities, such as running, swimming, cycling, or playing tennis or team sports. · Do not smoke. Smoking can make prediabetes worse. If you need help quitting, talk to your doctor about stop-smoking programs and medicines. These can increase your chances of quitting for good. · If your doctor prescribed medicines, take them exactly as prescribed. Call your doctor if you think you are having a problem with your medicine.  You will get more details on the specific medicines your doctor prescribes. When should you call for help? Watch closely for changes in your health, and be sure to contact your doctor if:  ? · You have any symptoms of diabetes. These may include:  ¨ Being thirsty more often. ¨ Urinating more. ¨ Being hungrier. ¨ Losing weight. ¨ Being very tired. ¨ Having blurry vision. ? · You have a wound that will not heal.   ? · You have an infection that will not go away. ? · You have problems with your blood pressure. ? · You want more information about diabetes and how you can keep from getting it. Where can you learn more? Go to http://uyly-jaime.info/. Enter I222 in the search box to learn more about \"Prediabetes: Care Instructions. \"  Current as of: March 13, 2017  Content Version: 11.4  © 5030-7735 [employer]. Care instructions adapted under license by [employer] (which disclaims liability or warranty for this information). If you have questions about a medical condition or this instruction, always ask your healthcare professional. [address] any warranty or liability for your use of this information.

## 2018-01-17 LAB
ALBUMIN SERPL-MCNC: 4.1 G/DL (ref 3.5–4.8)
ALBUMIN/GLOB SERPL: 1.2 {RATIO} (ref 1.2–2.2)
ALP SERPL-CCNC: 124 IU/L (ref 39–117)
ALT SERPL-CCNC: 14 IU/L (ref 0–32)
AST SERPL-CCNC: 15 IU/L (ref 0–40)
BILIRUB SERPL-MCNC: 0.4 MG/DL (ref 0–1.2)
BUN SERPL-MCNC: 24 MG/DL (ref 8–27)
BUN/CREAT SERPL: 33 (ref 12–28)
CALCIUM SERPL-MCNC: 9.2 MG/DL (ref 8.7–10.3)
CHLORIDE SERPL-SCNC: 101 MMOL/L (ref 96–106)
CHOLEST SERPL-MCNC: 176 MG/DL (ref 100–199)
CO2 SERPL-SCNC: 23 MMOL/L (ref 18–29)
CREAT SERPL-MCNC: 0.72 MG/DL (ref 0.57–1)
EST. AVERAGE GLUCOSE BLD GHB EST-MCNC: 103 MG/DL
GLOBULIN SER CALC-MCNC: 3.5 G/DL (ref 1.5–4.5)
GLUCOSE SERPL-MCNC: 105 MG/DL (ref 65–99)
HBA1C MFR BLD: 5.2 % (ref 4.8–5.6)
HDLC SERPL-MCNC: 42 MG/DL
INTERPRETATION, 910389: NORMAL
LDLC SERPL CALC-MCNC: 113 MG/DL (ref 0–99)
POTASSIUM SERPL-SCNC: 4.7 MMOL/L (ref 3.5–5.2)
PROT SERPL-MCNC: 7.6 G/DL (ref 6–8.5)
SODIUM SERPL-SCNC: 138 MMOL/L (ref 134–144)
TRIGL SERPL-MCNC: 104 MG/DL (ref 0–149)
VLDLC SERPL CALC-MCNC: 21 MG/DL (ref 5–40)

## 2018-01-17 NOTE — PROGRESS NOTES
Labs look great prediabetes is now in normal range and chol is much better.   Keep up the dietary changes and recheck labs in 6 months

## 2018-08-01 ENCOUNTER — OFFICE VISIT (OUTPATIENT)
Dept: FAMILY MEDICINE CLINIC | Age: 78
End: 2018-08-01

## 2018-08-01 VITALS
TEMPERATURE: 97.4 F | RESPIRATION RATE: 20 BRPM | BODY MASS INDEX: 30.13 KG/M2 | HEIGHT: 67 IN | OXYGEN SATURATION: 96 % | WEIGHT: 192 LBS | SYSTOLIC BLOOD PRESSURE: 166 MMHG | DIASTOLIC BLOOD PRESSURE: 70 MMHG | HEART RATE: 63 BPM

## 2018-08-01 DIAGNOSIS — R73.03 PREDIABETES: ICD-10-CM

## 2018-08-01 DIAGNOSIS — Z01.818 PRE-OP EVALUATION: Primary | ICD-10-CM

## 2018-08-01 RX ORDER — DILTIAZEM HYDROCHLORIDE 240 MG/1
CAPSULE, EXTENDED RELEASE ORAL
Refills: 3 | COMMUNITY
Start: 2018-07-01 | End: 2021-06-22

## 2018-08-01 NOTE — PROGRESS NOTES
Chief Complaint Patient presents with  Labs 6 month follow up 1. Have you been to the ER, urgent care clinic since your last visit? Hospitalized since your last visit? No 
 
2. Have you seen or consulted any other health care providers outside of the [address] [place] [address] since your last visit? Include any pap smears or colon screening. Yes, seen by Dr. Mic Gallego Cardiologist for a 6 month cardiac check.

## 2018-08-01 NOTE — MR AVS SNAPSHOT
[address] [address] 
[phone] Patient: Maria Elena Gupta MRN: [id number] REMINGTON:[birth year] Visit Information Date & Time Provider Department Dept. Phone Encounter #  
 8/1/2018  9:00 AM Jessica Spring [phone] [id number] Follow-up Instructions Return in about 6 months (around 2/1/2019), or if symptoms worsen or fail to improve. Upcoming Health Maintenance Date Due ZOSTER VACCINE AGE 60> 1/14/2001 GLAUCOMA SCREENING Q2Y 3/14/2006 Influenza Age 5 to Adult 8/1/2018 Pneumococcal 65+ Low/Medium Risk (2 of 2 - PCV13) 9/6/2018 DTaP/Tdap/Td series (2 - Td) 1/16/2028 Allergies as of 8/1/2018  Review Complete On: 8/1/2018 By: Hao Higuera, DO No Known Allergies Current Immunizations  Reviewed on 9/6/2017 Name Date Influenza High Dose Vaccine PF 9/6/2017 Influenza Vaccine 1/17/2017 Pneumococcal Polysaccharide (PPSV-23) 9/6/2017 Not reviewed this visit You Were Diagnosed With   
  
 Codes Comments Pre-op evaluation    -  Primary ICD-10-CM: P29.739 ICD-9-CM: V72.84 Prediabetes     ICD-10-CM: R73.03 
ICD-9-CM: 790.29 Vitals BP Pulse Temp Resp Height(growth percentile) Weight(growth percentile) 156/85 (BP 1 Location: Left arm, BP Patient Position: Sitting) 63 97.4 °F (36.3 °C) (Oral) 20 5' 7\" (1.702 m) 192 lb (87.1 kg) SpO2 BMI OB Status Smoking Status 96% 30.07 kg/m2 Menopause Former Smoker Vitals History BMI and BSA Data Body Mass Index Body Surface Area 30.07 kg/m 2 2.03 m 2 Preferred Pharmacy Pharmacy Name Phone [facility] [place], [address] [place] AT [facility] [phone] Your Updated Medication List  
  
   
This list is accurate as of 8/1/18 10:29 AM.  Always use your most recent med list.  
  
  
  
  
 apixaban 5 mg tablet Commonly known as:  ELIQUIS  
TAKE 1 TABLET BY MOUTH TWICE DAILY  
  
 diclofenac 1 % Gel Commonly known as:  VOLTAREN Apply 2 g to affected area four (4) times daily. * dilTIAZem  mg Tb24 tablet Commonly known as:  CARDIZEM LA Take  by mouth. * CARTIA  mg ER capsule Generic drug:  dilTIAZem CD TK ONE C PO  QD  
  
 lisinopril 10 mg tablet Commonly known as:  PRINIVIL, ZESTRIL  
TAKE 1 TABLET BY MOUTH DAILY * Notice: This list has 2 medication(s) that are the same as other medications prescribed for you. Read the directions carefully, and ask your doctor or other care provider to review them with you. We Performed the Following HEMOGLOBIN A1C WITH EAG [03830 CPT(R)] Follow-up Instructions Return in about 6 months (around 2/1/2019), or if symptoms worsen or fail to improve. Patient Instructions Body Mass Index: Care Instructions Your Care Instructions Body mass index (BMI) can help you see if your weight is raising your risk for health problems. It uses a formula to compare how much you weigh with how tall you are. · A BMI lower than 18.5 is considered underweight. · A BMI between 18.5 and 24.9 is considered healthy. · A BMI between 25 and 29.9 is considered overweight. A BMI of 30 or higher is considered obese. If your BMI is in the normal range, it means that you have a lower risk for weight-related health problems. If your BMI is in the overweight or obese range, you may be at increased risk for weight-related health problems, such as high blood pressure, heart disease, stroke, arthritis or joint pain, and diabetes. If your BMI is in the underweight range, you may be at increased risk for health problems such as fatigue, lower protection (immunity) against illness, muscle loss, bone loss, hair loss, and hormone problems. BMI is just one measure of your risk for weight-related health problems. You may be at higher risk for health problems if you are not active, you eat an unhealthy diet, or you drink too much alcohol or use tobacco products. Follow-up care is a key part of your treatment and safety. Be sure to make and go to all appointments, and call your doctor if you are having problems. It's also a good idea to know your test results and keep a list of the medicines you take. How can you care for yourself at home? · Practice healthy eating habits. This includes eating plenty of fruits, vegetables, whole grains, lean protein, and low-fat dairy. · If your doctor recommends it, get more exercise. Walking is a good choice. Bit by bit, increase the amount you walk every day. Try for at least 30 minutes on most days of the week. · Do not smoke. Smoking can increase your risk for health problems. If you need help quitting, talk to your doctor about stop-smoking programs and medicines. These can increase your chances of quitting for good. · Limit alcohol to 2 drinks a day for men and 1 drink a day for women. Too much alcohol can cause health problems. If you have a BMI higher than 25 · Your doctor may do other tests to check your risk for weight-related health problems. This may include measuring the distance around your waist. A waist measurement of more than 40 inches in men or 35 inches in women can increase the risk of weight-related health problems. · Talk with your doctor about steps you can take to stay healthy or improve your health. You may need to make lifestyle changes to lose weight and stay healthy, such as changing your diet and getting regular exercise. If you have a BMI lower than 18.5 · Your doctor may do other tests to check your risk for health problems. · Talk with your doctor about steps you can take to stay healthy or improve your health.  You may need to make lifestyle changes to gain or maintain weight and stay healthy, such as getting more healthy foods in your diet and doing exercises to build muscle. Where can you learn more? Go to http://yuly-jaime.info/. Enter S176 in the search box to learn more about \"Body Mass Index: Care Instructions. \" Current as of: October 13, 2016 Content Version: 11.4 © 6595-2418 [employer]. Care instructions adapted under license by [employer]IV[employer] (which disclaims liability or warranty for this information). If you have questions about a medical condition or this instruction, always ask your healthcare professional. [address] any warranty or liability for your use of this information. Introducing [facility]! Yoselyn Morel introduces Velotton patient portal. Now you can access parts of your medical record, email your doctor's office, and request medication refills online. 1. In your internet browser, go to [email or web address] 2. Click on the First Time User? Click Here link in the Sign In box. You will see the New Member Sign Up page. 3. Enter your Velotton Access Code exactly as it appears below. You will not need to use this code after youve completed the sign-up process. If you do not sign up before the expiration date, you must request a new code. · Velotton Access Code: [email or web address] Expires: 10/30/2018 10:29 AM 
 
4. Enter the last four digits of your Social Security Number (xxxx) and Date of Birth (mm/dd/yyyy) as indicated and click Submit. You will be taken to the next sign-up page. 5. Create a Velotton ID. This will be your Velotton login ID and cannot be changed, so think of one that is secure and easy to remember. 6. Create a Velotton password. You can change your password at any time. 7. Enter your Password Reset Question and Answer. This can be used at a later time if you forget your password. 8. Enter your e-mail address. You will receive e-mail notification when new information is available in [address] 9. Click Sign Up. You can now view and download portions of your medical record. 10. Click the Download Summary menu link to download a portable copy of your medical information. If you have questions, please visit the Frequently Asked Questions section of the Purchext website. Remember, Purchext is NOT to be used for urgent needs. For medical emergencies, dial 911. Now available from your iPhone and Android! Please provide this summary of care documentation to your next provider. Your primary care clinician is listed as Barbara Zee. If you have any questions after today's visit, please call [phone].

## 2018-08-01 NOTE — PROGRESS NOTES
Gisela Powell is a 69 y.o. female Chief Complaint Patient presents with  Labs 6 month follow up  
 pt here for pre op cataract surgery next tuesday and needs recheck of her prediabetes which was normal at 5.2 last check in jan pt is otherwise doing well. Gisela Powell is a 69 y.o. female is a 69 y.o. yo female who presents for preoperative evaluation. Latex Allergy:NO History of anesthesia reaction: NO History of PE/DVT:NO No Known Allergies Current Outpatient Prescriptions Medication Sig  CARTIA  mg ER capsule TK ONE C PO  QD  
 lisinopril (PRINIVIL, ZESTRIL) 10 mg tablet TAKE 1 TABLET BY MOUTH DAILY  apixaban (ELIQUIS) 5 mg tablet TAKE 1 TABLET BY MOUTH TWICE DAILY  diclofenac (VOLTAREN) 1 % gel Apply 2 g to affected area four (4) times daily.  diltiazem hcl 240 mg Tb24 Take  by mouth. No current facility-administered medications for this visit. Patient Active Problem List  
Diagnosis Code  Chronic atrial fibrillation (HCC) I48.2  Elevated cholesterol E78.00  Essential hypertension I10  
 Nonrheumatic aortic valve stenosis I35.0 History reviewed. No pertinent surgical history. Reviewed PmHx, RxHx, FmHx, SocHx, AllgHx and updated and dated in the chart. Review of Systems - negative except as listed above in the HPI Objective:  
 
Vitals:  
 08/01/18 0959 08/01/18 1044 BP: 156/85 166/70 Pulse: 63 Resp: 20 Temp: 97.4 °F (36.3 °C) TempSrc: Oral   
SpO2: 96% Weight: 192 lb (87.1 kg) Height: 5' 7\" (1.702 m) Physical Examination: General appearance - alert, well appearing, and in no distress Mental status - alert, oriented to person, place, and time Eyes - pupils equal and reactive, extraocular eye movements intact Ears - bilateral TM's and external ear canals normal 
Mouth - mucous membranes moist, pharynx normal without lesions Neck - supple, no significant adenopathy Lymphatics - no palpable lymphadenopathy, no hepatosplenomegaly Chest - clear to auscultation, no wheezes, rales or rhonchi, symmetric air entry Heart - normal rate, regular rhythm, normal S1, S2, no murmurs, rubs, clicks or gallops Abdomen - soft, nontender, nondistended, no masses or organomegaly Neurological - alert, oriented, normal speech, no focal findings or movement disorder noted Musculoskeletal - no joint tenderness, deformity or swelling Extremities - peripheral pulses normal, no pedal edema, no clubbing or cyanosis Assessment/ Plan:  
Diagnoses and all orders for this visit: 1. Pre-op evaluation Pt stable for sx, forms completed faxed and original returned to pt 2. Prediabetes 
-     HEMOGLOBIN A1C WITH EAG Follow-up Disposition: 
Return in about 6 months (around 2/1/2019), or if symptoms worsen or fail to improve. I have discussed the diagnosis with the patient and the intended plan as seen in the above orders. The patient has received an after-visit summary and questions were answered concerning future plans. Pt conveyed understanding of plan. Medication Side Effects and Warnings were discussed with patient Lazaro Andrade DO Discussed the patient's BMI with her. The BMI follow up plan is as follows:  
 
dietary management education, guidance, and counseling 
encourage exercise 
monitor weight 
prescribed dietary intake An After Visit Summary was printed and given to the patient.

## 2018-08-01 NOTE — PATIENT INSTRUCTIONS
Body Mass Index: Care Instructions Your Care Instructions Body mass index (BMI) can help you see if your weight is raising your risk for health problems. It uses a formula to compare how much you weigh with how tall you are. · A BMI lower than 18.5 is considered underweight. · A BMI between 18.5 and 24.9 is considered healthy. · A BMI between 25 and 29.9 is considered overweight. A BMI of 30 or higher is considered obese. If your BMI is in the normal range, it means that you have a lower risk for weight-related health problems. If your BMI is in the overweight or obese range, you may be at increased risk for weight-related health problems, such as high blood pressure, heart disease, stroke, arthritis or joint pain, and diabetes. If your BMI is in the underweight range, you may be at increased risk for health problems such as fatigue, lower protection (immunity) against illness, muscle loss, bone loss, hair loss, and hormone problems. BMI is just one measure of your risk for weight-related health problems. You may be at higher risk for health problems if you are not active, you eat an unhealthy diet, or you drink too much alcohol or use tobacco products. Follow-up care is a key part of your treatment and safety. Be sure to make and go to all appointments, and call your doctor if you are having problems. It's also a good idea to know your test results and keep a list of the medicines you take. How can you care for yourself at home? · Practice healthy eating habits. This includes eating plenty of fruits, vegetables, whole grains, lean protein, and low-fat dairy. · If your doctor recommends it, get more exercise. Walking is a good choice. Bit by bit, increase the amount you walk every day. Try for at least 30 minutes on most days of the week. · Do not smoke. Smoking can increase your risk for health problems. If you need help quitting, talk to your doctor about stop-smoking programs and medicines.  These can increase your chances of quitting for good. · Limit alcohol to 2 drinks a day for men and 1 drink a day for women. Too much alcohol can cause health problems. If you have a BMI higher than 25 · Your doctor may do other tests to check your risk for weight-related health problems. This may include measuring the distance around your waist. A waist measurement of more than 40 inches in men or 35 inches in women can increase the risk of weight-related health problems. · Talk with your doctor about steps you can take to stay healthy or improve your health. You may need to make lifestyle changes to lose weight and stay healthy, such as changing your diet and getting regular exercise. If you have a BMI lower than 18.5 · Your doctor may do other tests to check your risk for health problems. · Talk with your doctor about steps you can take to stay healthy or improve your health. You may need to make lifestyle changes to gain or maintain weight and stay healthy, such as getting more healthy foods in your diet and doing exercises to build muscle. Where can you learn more? Go to http://yuly-jaime.info/. Enter S176 in the search box to learn more about \"Body Mass Index: Care Instructions. \" Current as of: October 13, 2016 Content Version: 11.4 © 8432-2519 [employer]wise, [employer]orporated. Care instructions adapted under license by [employer] (which disclaims liability or warranty for this information). If you have questions about a medical condition or this instruction, always ask your healthcare professional. [address] any warranty or liability for your use of this information.

## 2018-08-02 LAB
EST. AVERAGE GLUCOSE BLD GHB EST-MCNC: 111 MG/DL
HBA1C MFR BLD: 5.5 % (ref 4.8–5.6)

## 2018-08-09 ENCOUNTER — TELEPHONE (OUTPATIENT)
Dept: FAMILY MEDICINE CLINIC | Age: 78
End: 2018-08-09

## 2018-08-09 NOTE — TELEPHONE ENCOUNTER
----- Message from Ana Ojeda sent at 8/9/2018  1:31 PM EDT -----  Regarding: Emely Martinez DO  Pt is returning a call and would like a call back from someone in the office. .  Pt contact [phone].

## 2018-08-15 ENCOUNTER — DOCUMENTATION ONLY (OUTPATIENT)
Dept: FAMILY MEDICINE CLINIC | Age: 78
End: 2018-08-15

## 2018-09-12 ENCOUNTER — DOCUMENTATION ONLY (OUTPATIENT)
Dept: FAMILY MEDICINE CLINIC | Age: 78
End: 2018-09-12

## 2018-09-12 ENCOUNTER — OFFICE VISIT (OUTPATIENT)
Dept: FAMILY MEDICINE CLINIC | Age: 78
End: 2018-09-12

## 2018-09-12 VITALS
SYSTOLIC BLOOD PRESSURE: 149 MMHG | HEART RATE: 60 BPM | OXYGEN SATURATION: 97 % | RESPIRATION RATE: 18 BRPM | HEIGHT: 67 IN | WEIGHT: 192 LBS | TEMPERATURE: 97.7 F | DIASTOLIC BLOOD PRESSURE: 62 MMHG | BODY MASS INDEX: 30.13 KG/M2

## 2018-09-12 DIAGNOSIS — Z23 ENCOUNTER FOR IMMUNIZATION: ICD-10-CM

## 2018-09-12 DIAGNOSIS — I48.20 CHRONIC ATRIAL FIBRILLATION (HCC): ICD-10-CM

## 2018-09-12 DIAGNOSIS — Z01.818 PRE-OP EVALUATION: Primary | ICD-10-CM

## 2018-09-12 RX ORDER — PREDNISOLONE ACETATE 10 MG/ML
SUSPENSION/ DROPS OPHTHALMIC
Refills: 1 | COMMUNITY
Start: 2018-09-08 | End: 2019-04-16

## 2018-09-12 RX ORDER — OFLOXACIN 3 MG/ML
SOLUTION/ DROPS OPHTHALMIC
Refills: 0 | COMMUNITY
Start: 2018-08-07 | End: 2019-04-16

## 2018-09-12 NOTE — PROGRESS NOTES
Faxed preop form to & @ [phone]. Confirmation number [id number]. Original form placed in scan folder for central scanning.

## 2018-09-12 NOTE — PROGRESS NOTES
Maria Elena Gupta is a 69 y.o. female Chief Complaint Patient presents with  Pre-op Exam  
  Cataract on 10/2/18 with [facility]  
 pt here for pre op and is having cataract surgery [facility] on 10/2/18. Pt had her L done and this went very well for her. Pt does need a refill on her eliquis for a fib and no issues with this medication. Maria Elena Gupta is a 69 y.o. female is a 69 y.o. yo female who presents for preoperative evaluation. Latex Allergy:NO History of anesthesia reaction: NO History of PE/DVT:NO No Known Allergies Current Outpatient Prescriptions Medication Sig  
 ofloxacin (FLOXIN) 0.3 % ophthalmic solution INSTILL 1 DROP INTO THE OS QID FOR 7 DAYS  prednisoLONE acetate (PRED FORTE) 1 % ophthalmic suspension INT 1 GTT INTO  OS QID  apixaban (ELIQUIS) 5 mg tablet TAKE 1 TABLET BY MOUTH TWICE DAILY  CARTIA  mg ER capsule TK ONE C PO  QD  
 lisinopril (PRINIVIL, ZESTRIL) 10 mg tablet TAKE 1 TABLET BY MOUTH DAILY  diclofenac (VOLTAREN) 1 % gel Apply 2 g to affected area four (4) times daily. (Patient not taking: Reported on 9/12/2018) No current facility-administered medications for this visit. Patient Active Problem List  
Diagnosis Code  Chronic atrial fibrillation (HCC) I48.2  Elevated cholesterol E78.00  Essential hypertension I10  
 Nonrheumatic aortic valve stenosis I35.0 Past Surgical History:  
Procedure Laterality Date  HX CATARACT REMOVAL Left 08/07/2018 Reviewed PmHx, RxHx, FmHx, SocHx, AllgHx and updated and dated in the chart. Review of Systems - negative except as listed above in the HPI Objective:  
 
Vitals:  
 09/12/18 1045 BP: 149/62 Pulse: 60 Resp: 18 Temp: 97.7 °F (36.5 °C) TempSrc: Oral  
SpO2: 97% Weight: 192 lb (87.1 kg) Height: 5' 7\" (1.702 m) Physical Examination: General appearance - alert, well appearing, and in no distress Mouth - mucous membranes moist, pharynx normal without lesions Neck - supple, no significant adenopathy Lymphatics - no palpable lymphadenopathy, no hepatosplenomegaly Chest - clear to auscultation, no wheezes, rales or rhonchi, symmetric air entry Heart - irregularly irregular rhythm with rate 70'F with 2/6 systiolic murmur Abdomen - soft, nontender, nondistended, no masses or organomegaly Neurological - alert, oriented, normal speech, no focal findings or movement disorder noted Musculoskeletal - no joint tenderness, deformity or swelling Extremities - peripheral pulses normal, no pedal edema, no clubbing or cyanosis Assessment/ Plan:  
Diagnoses and all orders for this visit: 1. Pre-op evaluation Form completed cleared for cataract syurgery 2. Encounter for immunization -     Influenza Vaccine Inactivated (IIV)(FLUAD), Subunit, Adjuvanted, IM, (16623) -     Administration fee () for Medicare insured patients 3. Chronic atrial fibrillation (HCC) 
-     apixaban (ELIQUIS) 5 mg tablet; TAKE 1 TABLET BY MOUTH TWICE DAILY form completed will fax and original returned to pt Follow-up Disposition: 
Return if symptoms worsen or fail to improve. I have discussed the diagnosis with the patient and the intended plan as seen in the above orders. The patient has received an after-visit summary and questions were answered concerning future plans. Pt conveyed understanding of plan. Medication Side Effects and Warnings were discussed with patient [address] [facility] [address], DO

## 2018-09-12 NOTE — MR AVS SNAPSHOT
[address] [address] 
[phone] Patient: Sarah Savage MRN: [id number] LYX:[id number] Visit Information Date & Time Provider Department Dept. Phone Encounter #  
 9/12/2018 10:30 AM Kamila Cannon, Gaby[address] Darleen Longoria [phone] [id number] Follow-up Instructions Return if symptoms worsen or fail to improve. Upcoming Health Maintenance Date Due ZOSTER VACCINE AGE 60> 1/14/2001 GLAUCOMA SCREENING Q2Y 3/14/2006 Influenza Age 5 to Adult 8/1/2018 Pneumococcal 65+ Low/Medium Risk (2 of 2 - PCV13) 9/6/2018 MEDICARE YEARLY EXAM 10/12/2018 DTaP/Tdap/Td series (2 - Td) 1/16/2028 Allergies as of 9/12/2018  Review Complete On: 9/12/2018 By: Kamila Cannon, DO No Known Allergies Current Immunizations  Reviewed on 9/6/2017 Name Date Influenza High Dose Vaccine PF 9/6/2017 Influenza Vaccine 1/17/2017 Influenza Vaccine (Tri) Adjuvanted  Incomplete Pneumococcal Polysaccharide (PPSV-23) 9/6/2017 Not reviewed this visit You Were Diagnosed With   
  
 Codes Comments Pre-op evaluation    -  Primary ICD-10-CM: C25.449 ICD-9-CM: V72.84 Encounter for immunization     ICD-10-CM: C52 ICD-9-CM: V03.89 Chronic atrial fibrillation (HCC)     ICD-10-CM: H68.4 ICD-9-CM: 427.31 Vitals BP Pulse Temp Resp Height(growth percentile) Weight(growth percentile) 149/62 (BP 1 Location: Left arm, BP Patient Position: Sitting) 60 97.7 °F (36.5 °C) (Oral) 18 5' 7\" (1.702 m) 192 lb (87.1 kg) SpO2 BMI OB Status Smoking Status 97% 30.07 kg/m2 Menopause Former Smoker Vitals History BMI and BSA Data Body Mass Index Body Surface Area 30.07 kg/m 2 2.03 m 2 Preferred Pharmacy Pharmacy Name Phone [facility] [place], [address] [place] [facility] [phone] Your Updated Medication List  
  
   
This list is accurate as of 9/12/18 11:25 AM.  Always use your most recent med list.  
  
  
  
  
 apixaban 5 mg tablet Commonly known as:  ELIQUIS  
TAKE 1 TABLET BY MOUTH TWICE DAILY CARTIA  mg ER capsule Generic drug:  dilTIAZem CD TK ONE C PO  QD  
  
 diclofenac 1 % Gel Commonly known as:  VOLTAREN Apply 2 g to affected area four (4) times daily. lisinopril 10 mg tablet Commonly known as:  PRINIVIL, ZESTRIL  
TAKE 1 TABLET BY MOUTH DAILY  
  
 ofloxacin 0.3 % ophthalmic solution Commonly known as:  FLOXIN  
INSTILL 1 DROP INTO THE OS QID FOR 7 DAYS  
  
 prednisoLONE acetate 1 % ophthalmic suspension Commonly known as:  PRED FORTE  
INT 1 GTT INTO  OS QID Prescriptions Sent to Pharmacy Refills  
 apixaban (ELIQUIS) 5 mg tablet 3 Sig: TAKE 1 TABLET BY MOUTH TWICE DAILY Class: Normal  
 Pharmacy: I AM AT [address] #: [phone] We Performed the Following ADMIN INFLUENZA VIRUS VAC [ [facility]] INFLUENZA VACCINE INACTIVATED (IIV), SUBUNIT, ADJUVANTED, IM [id number] CPT(R)] Follow-up Instructions Return if symptoms worsen or fail to improve. Patient Instructions A Healthy Lifestyle: Care Instructions Your Care Instructions A healthy lifestyle can help you feel good, stay at a healthy weight, and have plenty of energy for both work and play. A healthy lifestyle is something you can share with your whole family. A healthy lifestyle also can lower your risk for serious health problems, such as high blood pressure, heart disease, and diabetes. You can follow a few steps listed below to improve your health and the health of your family. Follow-up care is a key part of your treatment and safety.  Be sure to make and go to all appointments, and call your doctor if you are having problems. It's also a good idea to know your test results and keep a list of the medicines you take. How can you care for yourself at home? · Do not eat too much sugar, fat, or fast foods. You can still have dessert and treats now and then. The goal is moderation. · Start small to improve your eating habits. Pay attention to portion sizes, drink less juice and soda pop, and eat more fruits and vegetables. ¨ Eat a healthy amount of food. A 3-ounce serving of meat, for example, is about the size of a deck of cards. Fill the rest of your plate with vegetables and whole grains. ¨ Limit the amount of soda and sports drinks you have every day. Drink more water when you are thirsty. ¨ Eat at least 5 servings of fruits and vegetables every day. It may seem like a lot, but it is not hard to reach this goal. A serving or helping is 1 piece of fruit, 1 cup of vegetables, or 2 cups of leafy, raw vegetables. Have an apple or some carrot sticks as an afternoon snack instead of a candy bar. Try to have fruits and/or vegetables at every meal. 
· Make exercise part of your daily routine. You may want to start with simple activities, such as walking, bicycling, or slow swimming. Try to be active 30 to 60 minutes every day. You do not need to do all 30 to 60 minutes all at once. For example, you can exercise 3 times a day for 10 or 20 minutes. Moderate exercise is safe for most people, but it is always a good idea to talk to your doctor before starting an exercise program. 
· Keep moving. Josefina Mendozas the lawn, work in the garden, or Reelio. Take the stairs instead of the elevator at work. · If you smoke, quit. People who smoke have an increased risk for heart attack, stroke, cancer, and other lung illnesses. Quitting is hard, but there are ways to boost your chance of quitting tobacco for good. ¨ Use nicotine gum, patches, or lozenges. ¨ Ask your doctor about stop-smoking programs and medicines. ¨ Keep trying. In addition to reducing your risk of diseases in the future, you will notice some benefits soon after you stop using tobacco. If you have shortness of breath or asthma symptoms, they will likely get better within a few weeks after you quit. · Limit how much alcohol you drink. Moderate amounts of alcohol (up to 2 drinks a day for men, 1 drink a day for women) are okay. But drinking too much can lead to liver problems, high blood pressure, and other health problems. Family health If you have a family, there are many things you can do together to improve your health. · Eat meals together as a family as often as possible. · Eat healthy foods. This includes fruits, vegetables, lean meats and dairy, and whole grains. · Include your family in your fitness plan. Most people think of activities such as jogging or tennis as the way to fitness, but there are many ways you and your family can be more active. Anything that makes you breathe hard and gets your heart pumping is exercise. Here are some tips: 
¨ Walk to do errands or to take your child to school or the bus. ¨ Go for a family bike ride after dinner instead of watching TV. Where can you learn more? Go to http://uyly-jaime.info/. Enter X679 in the search box to learn more about \"A Healthy Lifestyle: Care Instructions. \" Current as of: December 7, 2017 Content Version: 11.7 © 2148-0422 [employer]. Care instructions adapted under license by [employer] (which disclaims liability or warranty for this information). If you have questions about a medical condition or this instruction, always ask your healthcare professional. [address] [address] any warranty or liability for your use of this information. Introducing [facility]! [facility] introduces LegalZoom patient portal. Now you can access parts of your medical record, email your doctor's office, and request medication refills online. 1. In your internet browser, go to [email or web address] 2. Click on the First Time User? Click Here link in the Sign In box. You will see the New Member Sign Up page. 3. Enter your [email or web address] Access Code exactly as it appears below. You will not need to use this code after youve completed the sign-up process. If you do not sign up before the expiration date, you must request a new code. · [email or web address] Access Code: [email or web address] Expires: 10/30/2018 10:29 AM 
 
4. Enter the last four digits of your Social Security Number (xxxx) and Date of Birth (mm/dd/yyyy) as indicated and click Submit. You will be taken to the next sign-up page. 5. Create a Vapremat ID. This will be your [email or web address] login ID and cannot be changed, so think of one that is secure and easy to remember. 6. Create a [email or web address] password. You can change your password at any time. 7. Enter your Password Reset Question and Answer. This can be used at a later time if you forget your password. 8. Enter your e-mail address. You will receive e-mail notification when new information is available in [address] 9. Click Sign Up. You can now view and download portions of your medical record. 10. Click the Download Summary menu link to download a portable copy of your medical information. If you have questions, please visit the Frequently Asked Questions section of the [email or web address] website. Remember, [email or web address] is NOT to be used for urgent needs. For medical emergencies, dial 911. Now available from your iPhone and Android! Please provide this summary of care documentation to your next provider. Your primary care clinician is listed as Jim Day. If you have any questions after today's visit, please call [phone].

## 2018-09-12 NOTE — PROGRESS NOTES
Shaina Severino is a 69 y.o. female Chief Complaint Patient presents with  Pre-op Exam  
  Cataract on 10/2/18 with [facility] 1. Have you been to the ER, urgent care clinic since your last visit? Hospitalized since your last visit? No 
 
2. Have you seen or consulted any other health care providers outside of the [address] [place] [address] since your last visit? Include any pap smears or colon screening. Yes, seen at [facility] in August 2018. Visit Vitals  /62 (BP 1 Location: Left arm, BP Patient Position: Sitting)  Pulse 60  Temp 97.7 °F (36.5 °C) (Oral)  Resp 18  Ht 5' 7\" (1.702 m)  Wt 192 lb (87.1 kg)  SpO2 97%  BMI 30.07 kg/m2

## 2018-09-12 NOTE — PATIENT INSTRUCTIONS

## 2018-11-14 DIAGNOSIS — I10 ESSENTIAL HYPERTENSION: ICD-10-CM

## 2018-11-14 RX ORDER — LISINOPRIL 10 MG/1
TABLET ORAL
Qty: 90 TAB | Refills: 3 | Status: SHIPPED | OUTPATIENT
Start: 2018-11-14 | End: 2019-04-16

## 2018-11-14 NOTE — TELEPHONE ENCOUNTER
----- Message from Rosetta Collins sent at 11/14/2018 10:58 AM EST -----  Regarding: [facility]/Rx  Dinora Kahn with [facility] is requesting a Rx for Lisinopril. [facility] number is [phone].

## 2019-04-16 ENCOUNTER — OFFICE VISIT (OUTPATIENT)
Dept: FAMILY MEDICINE CLINIC | Age: 79
End: 2019-04-16

## 2019-04-16 VITALS
HEIGHT: 67 IN | WEIGHT: 190 LBS | DIASTOLIC BLOOD PRESSURE: 62 MMHG | RESPIRATION RATE: 16 BRPM | BODY MASS INDEX: 29.82 KG/M2 | HEART RATE: 62 BPM | OXYGEN SATURATION: 95 % | SYSTOLIC BLOOD PRESSURE: 161 MMHG | TEMPERATURE: 97.5 F

## 2019-04-16 DIAGNOSIS — E78.00 ELEVATED CHOLESTEROL: Primary | ICD-10-CM

## 2019-04-16 DIAGNOSIS — Z00.00 MEDICARE ANNUAL WELLNESS VISIT, SUBSEQUENT: ICD-10-CM

## 2019-04-16 DIAGNOSIS — I10 ESSENTIAL HYPERTENSION: ICD-10-CM

## 2019-04-16 DIAGNOSIS — Z13.39 SCREENING FOR ALCOHOLISM: ICD-10-CM

## 2019-04-16 DIAGNOSIS — M81.0 AGE-RELATED OSTEOPOROSIS WITHOUT CURRENT PATHOLOGICAL FRACTURE: ICD-10-CM

## 2019-04-16 RX ORDER — LISINOPRIL 20 MG/1
20 TABLET ORAL DAILY
Qty: 90 TAB | Refills: 0
Start: 2019-04-16 | End: 2021-06-22

## 2019-04-16 NOTE — PROGRESS NOTES
Yves Anders is a 67 y.o. female had concerns including Follow-up and Labs. Pt here for fasting labs. Pt had cataract surgery and states her vision is much better now. Pt is also due for her medicare annual.  Pt with HLD and is not on a statin at 79. Pt is also on lisinopril for her BP and was increased to 20 by cardiology. Pt states the a fib does not bother her at all. Had an echo recently and states no change. Pt has not followed up with rheum and will refer again for her osteoporosis pt would like to do prolia. she is a 67y.o. year old female who presents for evalution. Reviewed PmHx, RxHx, FmHx, SocHx, AllgHx and updated and dated in the chart. Review of Systems - negative except as listed above in the HPI Objective:  
 
Vitals:  
 04/16/19 1123 04/16/19 1148 BP: 171/65 161/62 Pulse: 62 Resp: 16 Temp: 97.5 °F (36.4 °C) TempSrc: Oral   
SpO2: 95% Weight: 190 lb (86.2 kg) Height: 5' 7\" (1.702 m) Current Outpatient Medications Medication Sig  
 lisinopril (PRINIVIL, ZESTRIL) 20 mg tablet Take 1 Tab by mouth daily. TAKE 1 TABLET BY MOUTH DAILY  denosumab (PROLIA) 60 mg/mL injection subq injection q 6 months in office  apixaban (ELIQUIS) 5 mg tablet TAKE 1 TABLET BY MOUTH TWICE DAILY  CARTIA  mg ER capsule TK ONE C PO  QD No current facility-administered medications for this visit. Physical Examination: General appearance - alert, well appearing, and in no distress Mental status - alert, oriented to person, place, and time Chest - clear to auscultation, no wheezes, rales or rhonchi, symmetric air entry Heart - systolic ejection murmur irregularly irregular 2/6 murmur Abdomen - soft, nontender, nondistended, no masses or organomegaly Assessment/ Plan:  
Diagnoses and all orders for this visit: 1. Elevated cholesterol -     LIPID PANEL 
-     METABOLIC PANEL, COMPREHENSIVE 2. Essential hypertension -     lisinopril (PRINIVIL, ZESTRIL) 20 mg tablet; Take 1 Tab by mouth daily. TAKE 1 TABLET BY MOUTH DAILY 
-     LIPID PANEL 
-     METABOLIC PANEL, COMPREHENSIVE 3. Medicare annual wellness visit, subsequent 
-     CBC WITH AUTOMATED DIFF 4. Screening for alcoholism -     UT ANNUAL ALCOHOL SCREEN 15 MIN 5. Age-related osteoporosis without current pathological fracture 
-     REFERRAL TO RHEUMATOLOGY 
-     denosumab (PROLIA) 60 mg/mL injection; subq injection q 6 months in office If able to afford prolia from pharm will make an injection only appt to have this administered here in office. If not will make an appt with Dr Pollo Thomas office. Follow-up and Dispositions · Return if symptoms worsen or fail to improve. I have discussed the diagnosis with the patient and the intended plan as seen in the above orders. The patient has received an after-visit summary and questions were answered concerning future plans. Pt conveyed understanding of plan. Medication Side Effects and Warnings were discussed with patient [id number] [facility] [address], DO This is the Subsequent Medicare Annual Wellness Exam, performed 12 months or more after the Initial AWV or the last Subsequent AWV I have reviewed the patient's medical history in detail and updated the computerized patient record. History Past Medical History:  
Diagnosis Date  A-fib ([facility] [address] Past Surgical History:  
Procedure Laterality Date  HX CATARACT REMOVAL Left 08/07/2018 Current Outpatient Medications Medication Sig Dispense Refill  lisinopril (PRINIVIL, ZESTRIL) 20 mg tablet Take 1 Tab by mouth daily. TAKE 1 TABLET BY MOUTH DAILY 90 Tab 0  
 denosumab (PROLIA) 60 mg/mL injection subq injection q 6 months in office 1 mL 0  
 apixaban (ELIQUIS) 5 mg tablet TAKE 1 TABLET BY MOUTH TWICE DAILY 180 Tab 3  
 CARTIA  mg ER capsule TK ONE C PO  QD  3 No Known Allergies Family History Problem Relation Age of Onset  Heart Disease Mother Social History Tobacco Use  Smoking status: Former Smoker  Smokeless tobacco: Never Used Substance Use Topics  Alcohol use: No  
 
Patient Active Problem List  
Diagnosis Code  Chronic atrial fibrillation (HCC) I48.2  Elevated cholesterol E78.00  Essential hypertension I10  
 Nonrheumatic aortic valve stenosis I35.0 Depression Risk Factor Screening:  
 
3 most recent PHQ Screens 4/16/2019 Little interest or pleasure in doing things Not at all Feeling down, depressed, irritable, or hopeless Not at all Total Score PHQ 2 0 Alcohol Risk Factor Screening: You do not drink alcohol or very rarely. Functional Ability and Level of Safety:  
Hearing Loss Hearing is good. Activities of Daily Living The home contains: grab bars Patient does total self care Fall Risk Fall Risk Assessment, last 12 mths 4/16/2019 Able to walk? Yes Fall in past 12 months? No  
 
 
Abuse Screen Patient is not abused Cognitive Screening Evaluation of Cognitive Function: 
Has your family/caregiver stated any concerns about your memory: no 
Normal 
 
Patient Care Team  
Patient Care Team: 
Kelly Berry DO as PCP - General (Family Practice) Assessment/Plan Education and counseling provided: 
Are appropriate based on today's review and evaluation End-of-Life planning (with patient's consent) Diagnoses and all orders for this visit: 1. Elevated cholesterol -     LIPID PANEL 
-     METABOLIC PANEL, COMPREHENSIVE 2. Essential hypertension 
-     lisinopril (PRINIVIL, ZESTRIL) 20 mg tablet; Take 1 Tab by mouth daily. TAKE 1 TABLET BY MOUTH DAILY 
-     LIPID PANEL 
-     METABOLIC PANEL, COMPREHENSIVE 3. Medicare annual wellness visit, subsequent 
-     CBC WITH AUTOMATED DIFF 4. Screening for alcoholism -     AL ANNUAL ALCOHOL SCREEN 15 MIN 5. Age-related osteoporosis without current pathological fracture -     REFERRAL TO RHEUMATOLOGY 
-     denosumab (PROLIA) 60 mg/mL injection; subq injection q 6 months in office Health Maintenance Due Topic Date Due  Shingrix Vaccine Age 50> (1 of 2) 03/14/1991  GLAUCOMA SCREENING Q2Y  03/14/2006  Pneumococcal 65+ years (2 of 2 - PCV13) 09/06/2018  MEDICARE YEARLY EXAM  10/18/2018 I have discussed the diagnosis with the patient and the intended plan as seen in the above orders. The patient has received an after-visit summary and questions were answered concerning future plans. Pt conveyed understanding of plan. Dr Papi Cox

## 2019-04-16 NOTE — PROGRESS NOTES
Chief Complaint Patient presents with  Follow-up  Labs Patient presents in office today for f/u and labs. No concerns. 1. Have you been to the ER, urgent care clinic since your last visit? Hospitalized since your last visit? No 
 
2. Have you seen or consulted any other health care providers outside of the [address] [place] [address] since your last visit? Include any pap smears or colon screening. Yes 1/2019 Dr. Aldo Romero cardiology Learning Assessment 1/26/2017 PRIMARY LEARNER Patient HIGHEST LEVEL OF EDUCATION - PRIMARY LEARNER  GRADUATED HIGH SCHOOL OR GED  
BARRIERS PRIMARY LEARNER NONE  
CO-LEARNER CAREGIVER No  
PRIMARY LANGUAGE ENGLISH  
LEARNER PREFERENCE PRIMARY DEMONSTRATION  
ANSWERED BY pt  
RELATIONSHIP SELF

## 2019-04-16 NOTE — PATIENT INSTRUCTIONS
A Healthy Lifestyle: Care Instructions Your Care Instructions A healthy lifestyle can help you feel good, stay at a healthy weight, and have plenty of energy for both work and play. A healthy lifestyle is something you can share with your whole family. A healthy lifestyle also can lower your risk for serious health problems, such as high blood pressure, heart disease, and diabetes. You can follow a few steps listed below to improve your health and the health of your family. Follow-up care is a key part of your treatment and safety. Be sure to make and go to all appointments, and call your doctor if you are having problems. It's also a good idea to know your test results and keep a list of the medicines you take. How can you care for yourself at home? · Do not eat too much sugar, fat, or fast foods. You can still have dessert and treats now and then. The goal is moderation. · Start small to improve your eating habits. Pay attention to portion sizes, drink less juice and soda pop, and eat more fruits and vegetables. ? Eat a healthy amount of food. A 3-ounce serving of meat, for example, is about the size of a deck of cards. Fill the rest of your plate with vegetables and whole grains. ? Limit the amount of soda and sports drinks you have every day. Drink more water when you are thirsty. ? Eat at least 5 servings of fruits and vegetables every day. It may seem like a lot, but it is not hard to reach this goal. A serving or helping is 1 piece of fruit, 1 cup of vegetables, or 2 cups of leafy, raw vegetables. Have an apple or some carrot sticks as an afternoon snack instead of a candy bar. Try to have fruits and/or vegetables at every meal. 
· Make exercise part of your daily routine. You may want to start with simple activities, such as walking, bicycling, or slow swimming. Try to be active 30 to 60 minutes every day.  You do not need to do all 30 to 60 minutes all at once. For example, you can exercise 3 times a day for 10 or 20 minutes. Moderate exercise is safe for most people, but it is always a good idea to talk to your doctor before starting an exercise program. 
· Keep moving. Lucas Gut the lawn, work in the garden, or [employer]. Take the stairs instead of the elevator at work. · If you smoke, quit. People who smoke have an increased risk for heart attack, stroke, cancer, and other lung illnesses. Quitting is hard, but there are ways to boost your chance of quitting tobacco for good. ? Use nicotine gum, patches, or lozenges. ? Ask your doctor about stop-smoking programs and medicines. ? Keep trying. In addition to reducing your risk of diseases in the future, you will notice some benefits soon after you stop using tobacco. If you have shortness of breath or asthma symptoms, they will likely get better within a few weeks after you quit. · Limit how much alcohol you drink. Moderate amounts of alcohol (up to 2 drinks a day for men, 1 drink a day for women) are okay. But drinking too much can lead to liver problems, high blood pressure, and other health problems. Family health If you have a family, there are many things you can do together to improve your health. · Eat meals together as a family as often as possible. · Eat healthy foods. This includes fruits, vegetables, lean meats and dairy, and whole grains. · Include your family in your fitness plan. Most people think of activities such as jogging or tennis as the way to fitness, but there are many ways you and your family can be more active. Anything that makes you breathe hard and gets your heart pumping is exercise. Here are some tips: 
? Walk to do errands or to take your child to school or the bus. 
? Go for a family bike ride after dinner instead of watching TV. Where can you learn more? Go to http://yuly-jaime.info/. Enter K802 in the search box to learn more about \"A Healthy Lifestyle: Care Instructions. \" Current as of: September 11, 2018 Content Version: 11.9 © 5484-4765 [employer], [employer]. Care instructions adapted under license by [employer] (which disclaims liability or warranty for this information). If you have questions about a medical condition or this instruction, always ask your healthcare professional. [address] any warranty or liability for your use of this information. Medicare Wellness Visit, Female The best way to live healthy is to have a lifestyle where you eat a well-balanced diet, exercise regularly, limit alcohol use, and quit all forms of tobacco/nicotine, if applicable. Regular preventive services are another way to keep healthy. Preventive services (vaccines, screening tests, monitoring & exams) can help personalize your care plan, which helps you manage your own care. Screening tests can find health problems at the earliest stages, when they are easiest to treat. [facility] follows the current, evidence-based guidelines published by the [facility] Keshawn Aracel[employer] (USPSTF) when recommending preventive services for our patients. Because we follow these guidelines, sometimes recommendations change over time as research supports it. (For example, mammograms used to be recommended annually. Even though Medicare will still pay for an annual mammogram, the newer guidelines recommend a mammogram every two years for women of average risk.) Of course, you and your doctor may decide to screen more often for some diseases, based on your risk and your health status. Preventive services for you include: - Medicare offers their members a free annual wellness visit, which is time for you and your primary care provider to discuss and plan for your preventive service needs. Take advantage of this benefit every year! -All adults over the age of 72 should receive the recommended pneumonia vaccines. Current USPSTF guidelines recommend a series of two vaccines for the best pneumonia protection.  
-All adults should have a flu vaccine yearly and a tetanus vaccine every 10 years. All adults age 61 and older should receive a shingles vaccine once in their lifetime.   
-A bone mass density test is recommended when a woman turns 65 to screen for osteoporosis. This test is only recommended one time, as a screening. Some providers will use this same test as a disease monitoring tool if you already have osteoporosis. -All adults age 38-68 who are overweight should have a diabetes screening test once every three years.  
-Other screening tests and preventive services for persons with diabetes include: an eye exam to screen for diabetic retinopathy, a kidney function test, a foot exam, and stricter control over your cholesterol.  
-Cardiovascular screening for adults with routine risk involves an electrocardiogram (ECG) at intervals determined by your doctor.  
-Colorectal cancer screenings should be done for adults age 54-65 with no increased risk factors for colorectal cancer. There are a number of acceptable methods of screening for this type of cancer. Each test has its own benefits and drawbacks. Discuss with your doctor what is most appropriate for you during your annual wellness visit. The different tests include: colonoscopy (considered the best screening method), a fecal occult blood test, a fecal DNA test, and sigmoidoscopy. -Breast cancer screenings are recommended every other year for women of normal risk, age 54-69. 
-Cervical cancer screenings for women over age 72 are only recommended with certain risk factors.  
-All adults born between [facility] should be screened once for Hepatitis C. Here is a list of your current Health Maintenance items (your personalized list of preventive services) with a due date: Health Maintenance Due Topic Date Due  Shingles Vaccine (1 of 2) 03/14/1991  Glaucoma Screening   03/14/2006  Pneumococcal Vaccine (2 of 2 - PCV13) 09/06/2018 [address] [place] [address] Annual Well Visit  10/18/2018

## 2019-04-17 LAB
ALBUMIN SERPL-MCNC: 4.2 G/DL (ref 3.5–4.8)
ALBUMIN/GLOB SERPL: 1.1 {RATIO} (ref 1.2–2.2)
ALP SERPL-CCNC: 110 IU/L (ref 39–117)
ALT SERPL-CCNC: 11 IU/L (ref 0–32)
AST SERPL-CCNC: 19 IU/L (ref 0–40)
BASOPHILS # BLD AUTO: 0.1 X10E3/UL (ref 0–0.2)
BASOPHILS NFR BLD AUTO: 1 %
BILIRUB SERPL-MCNC: 0.5 MG/DL (ref 0–1.2)
BUN SERPL-MCNC: 23 MG/DL (ref 8–27)
BUN/CREAT SERPL: 32 (ref 12–28)
CALCIUM SERPL-MCNC: 9.3 MG/DL (ref 8.7–10.3)
CHLORIDE SERPL-SCNC: 102 MMOL/L (ref 96–106)
CHOLEST SERPL-MCNC: 182 MG/DL (ref 100–199)
CO2 SERPL-SCNC: 22 MMOL/L (ref 20–29)
CREAT SERPL-MCNC: 0.73 MG/DL (ref 0.57–1)
EOSINOPHIL # BLD AUTO: 0.1 X10E3/UL (ref 0–0.4)
EOSINOPHIL NFR BLD AUTO: 2 %
ERYTHROCYTE [DISTWIDTH] IN BLOOD BY AUTOMATED COUNT: 15.2 % (ref 12.3–15.4)
GLOBULIN SER CALC-MCNC: 3.8 G/DL (ref 1.5–4.5)
GLUCOSE SERPL-MCNC: 93 MG/DL (ref 65–99)
HCT VFR BLD AUTO: 39.1 % (ref 34–46.6)
HDLC SERPL-MCNC: 43 MG/DL
HGB BLD-MCNC: 13.3 G/DL (ref 11.1–15.9)
IMM GRANULOCYTES # BLD AUTO: 0.1 X10E3/UL (ref 0–0.1)
IMM GRANULOCYTES NFR BLD AUTO: 2 %
INTERPRETATION, 910389: NORMAL
LDLC SERPL CALC-MCNC: 119 MG/DL (ref 0–99)
LYMPHOCYTES # BLD AUTO: 1.4 X10E3/UL (ref 0.7–3.1)
LYMPHOCYTES NFR BLD AUTO: 26 %
MCH RBC QN AUTO: 29.8 PG (ref 26.6–33)
MCHC RBC AUTO-ENTMCNC: 34 G/DL (ref 31.5–35.7)
MCV RBC AUTO: 88 FL (ref 79–97)
MONOCYTES # BLD AUTO: 0.6 X10E3/UL (ref 0.1–0.9)
MONOCYTES NFR BLD AUTO: 11 %
NEUTROPHILS # BLD AUTO: 3.2 X10E3/UL (ref 1.4–7)
NEUTROPHILS NFR BLD AUTO: 58 %
PLATELET # BLD AUTO: 250 X10E3/UL (ref 150–379)
POTASSIUM SERPL-SCNC: 4.3 MMOL/L (ref 3.5–5.2)
PROT SERPL-MCNC: 8 G/DL (ref 6–8.5)
RBC # BLD AUTO: 4.47 X10E6/UL (ref 3.77–5.28)
SODIUM SERPL-SCNC: 138 MMOL/L (ref 134–144)
TRIGL SERPL-MCNC: 99 MG/DL (ref 0–149)
VLDLC SERPL CALC-MCNC: 20 MG/DL (ref 5–40)
WBC # BLD AUTO: 5.5 X10E3/UL (ref 3.4–10.8)

## 2019-04-17 NOTE — PROGRESS NOTES
Labs stable recheck 1 year. Was she able to get the prolia at pharmacy? If so make sure she schedules an injection only.   If not just remind her to contact Dr Baljit Hollingsworth for this, I gave her info yesterday

## 2019-04-19 NOTE — PROGRESS NOTES
Called and spoke with patient in reference to labs. Verbalized understanding. Apt scheduled for 4/24/19 for prolia injection.

## 2019-04-24 ENCOUNTER — OFFICE VISIT (OUTPATIENT)
Dept: FAMILY MEDICINE CLINIC | Age: 79
End: 2019-04-24

## 2019-04-24 DIAGNOSIS — M81.0 AGE-RELATED OSTEOPOROSIS WITHOUT CURRENT PATHOLOGICAL FRACTURE: Primary | ICD-10-CM

## 2019-04-24 NOTE — PROGRESS NOTES
Chief Complaint   Patient presents with    Immunization/Injection     prolia     Patient presents in office today for nurse visit only for prolia injection. 60 MG/ ML given in left abdomen.   Lot # [id number]  [address] # [id number]  : [employer]  Expiration date 6/30/21

## 2019-04-24 NOTE — PATIENT INSTRUCTIONS
A Healthy Lifestyle: Care Instructions  Your Care Instructions    A healthy lifestyle can help you feel good, stay at a healthy weight, and have plenty of energy for both work and play. A healthy lifestyle is something you can share with your whole family. A healthy lifestyle also can lower your risk for serious health problems, such as high blood pressure, heart disease, and diabetes. You can follow a few steps listed below to improve your health and the health of your family. Follow-up care is a key part of your treatment and safety. Be sure to make and go to all appointments, and call your doctor if you are having problems. It's also a good idea to know your test results and keep a list of the medicines you take. How can you care for yourself at home? · Do not eat too much sugar, fat, or fast foods. You can still have dessert and treats now and then. The goal is moderation. · Start small to improve your eating habits. Pay attention to portion sizes, drink less juice and soda pop, and eat more fruits and vegetables. ? Eat a healthy amount of food. A 3-ounce serving of meat, for example, is about the size of a deck of cards. Fill the rest of your plate with vegetables and whole grains. ? Limit the amount of soda and sports drinks you have every day. Drink more water when you are thirsty. ? Eat at least 5 servings of fruits and vegetables every day. It may seem like a lot, but it is not hard to reach this goal. A serving or helping is 1 piece of fruit, 1 cup of vegetables, or 2 cups of leafy, raw vegetables. Have an apple or some carrot sticks as an afternoon snack instead of a candy bar. Try to have fruits and/or vegetables at every meal.  · Make exercise part of your daily routine. You may want to start with simple activities, such as walking, bicycling, or slow swimming. Try to be active 30 to 60 minutes every day. You do not need to do all 30 to 60 minutes all at once.  For example, you can exercise 3 times a day for 10 or 20 minutes. Moderate exercise is safe for most people, but it is always a good idea to talk to your doctor before starting an exercise program.  · Keep moving. Danna Perales the lawn, work in the garden, or [employer]. Take the stairs instead of the elevator at work. · If you smoke, quit. People who smoke have an increased risk for heart attack, stroke, cancer, and other lung illnesses. Quitting is hard, but there are ways to boost your chance of quitting tobacco for good. ? Use nicotine gum, patches, or lozenges. ? Ask your doctor about stop-smoking programs and medicines. ? Keep trying. In addition to reducing your risk of diseases in the future, you will notice some benefits soon after you stop using tobacco. If you have shortness of breath or asthma symptoms, they will likely get better within a few weeks after you quit. · Limit how much alcohol you drink. Moderate amounts of alcohol (up to 2 drinks a day for men, 1 drink a day for women) are okay. But drinking too much can lead to liver problems, high blood pressure, and other health problems. Family health  If you have a family, there are many things you can do together to improve your health. · Eat meals together as a family as often as possible. · Eat healthy foods. This includes fruits, vegetables, lean meats and dairy, and whole grains. · Include your family in your fitness plan. Most people think of activities such as jogging or tennis as the way to fitness, but there are many ways you and your family can be more active. Anything that makes you breathe hard and gets your heart pumping is exercise. Here are some tips:  ? Walk to do errands or to take your child to school or the bus.  ? Go for a family bike ride after dinner instead of watching TV. Where can you learn more? Go to http://yuly-jaime.info/. Enter N104 in the search box to learn more about \"A Healthy Lifestyle: Care Instructions. \"  Current as of: September 11, 2018  Content Version: 11.9  © 9533-8453 [employer]. Care instructions adapted under license by [employer] (which disclaims liability or warranty for this information). If you have questions about a medical condition or this instruction, always ask your healthcare professional. [address] any warranty or liability for your use of this information.

## 2019-05-24 ENCOUNTER — DOCUMENTATION ONLY (OUTPATIENT)
Dept: FAMILY MEDICINE CLINIC | Age: 79
End: 2019-05-24

## 2019-05-24 NOTE — PROGRESS NOTES
[facility] Request for medical records was faxed on 5/21/19 to [address] [place] [address] to be processed

## 2019-09-08 DIAGNOSIS — I48.20 CHRONIC ATRIAL FIBRILLATION (HCC): ICD-10-CM

## 2019-09-23 ENCOUNTER — TELEPHONE (OUTPATIENT)
Dept: FAMILY MEDICINE CLINIC | Age: 79
End: 2019-09-23

## 2019-09-23 NOTE — TELEPHONE ENCOUNTER
Does she want me to print an Rx for her to hold onto?   Otherwise she will need to call back when she needs the Rx sent in

## 2019-09-24 ENCOUNTER — TELEPHONE (OUTPATIENT)
Dept: FAMILY MEDICINE CLINIC | Age: 79
End: 2019-09-24

## 2019-09-24 NOTE — TELEPHONE ENCOUNTER
Called and spoke with patient. She states that she will call a week before she is due to request the refill.

## 2019-09-24 NOTE — TELEPHONE ENCOUNTER
2nd attempt. Patient answered but phone was breaking up and there was a disconnect. Tried again and got VM. LVM for patient to call the office back.

## 2019-10-25 DIAGNOSIS — M81.0 AGE-RELATED OSTEOPOROSIS WITHOUT CURRENT PATHOLOGICAL FRACTURE: ICD-10-CM

## 2019-11-01 ENCOUNTER — OFFICE VISIT (OUTPATIENT)
Dept: FAMILY MEDICINE CLINIC | Age: 79
End: 2019-11-01

## 2019-11-01 DIAGNOSIS — Z23 ENCOUNTER FOR IMMUNIZATION: ICD-10-CM

## 2019-11-01 DIAGNOSIS — M81.0 AGE-RELATED OSTEOPOROSIS WITHOUT CURRENT PATHOLOGICAL FRACTURE: Primary | ICD-10-CM

## 2019-11-01 NOTE — PATIENT INSTRUCTIONS
A Healthy Lifestyle: Care Instructions  Your Care Instructions    A healthy lifestyle can help you feel good, stay at a healthy weight, and have plenty of energy for both work and play. A healthy lifestyle is something you can share with your whole family. A healthy lifestyle also can lower your risk for serious health problems, such as high blood pressure, heart disease, and diabetes. You can follow a few steps listed below to improve your health and the health of your family. Follow-up care is a key part of your treatment and safety. Be sure to make and go to all appointments, and call your doctor if you are having problems. It's also a good idea to know your test results and keep a list of the medicines you take. How can you care for yourself at home? · Do not eat too much sugar, fat, or fast foods. You can still have dessert and treats now and then. The goal is moderation. · Start small to improve your eating habits. Pay attention to portion sizes, drink less juice and soda pop, and eat more fruits and vegetables. ? Eat a healthy amount of food. A 3-ounce serving of meat, for example, is about the size of a deck of cards. Fill the rest of your plate with vegetables and whole grains. ? Limit the amount of soda and sports drinks you have every day. Drink more water when you are thirsty. ? Eat at least 5 servings of fruits and vegetables every day. It may seem like a lot, but it is not hard to reach this goal. A serving or helping is 1 piece of fruit, 1 cup of vegetables, or 2 cups of leafy, raw vegetables. Have an apple or some carrot sticks as an afternoon snack instead of a candy bar. Try to have fruits and/or vegetables at every meal.  · Make exercise part of your daily routine. You may want to start with simple activities, such as walking, bicycling, or slow swimming. Try to be active 30 to 60 minutes every day. You do not need to do all 30 to 60 minutes all at once.  For example, you can exercise 3 times a day for 10 or 20 minutes. Moderate exercise is safe for most people, but it is always a good idea to talk to your doctor before starting an exercise program.  · Keep moving. Jian Velazquez the lawn, work in the garden, or [employer]. Take the stairs instead of the elevator at work. · If you smoke, quit. People who smoke have an increased risk for heart attack, stroke, cancer, and other lung illnesses. Quitting is hard, but there are ways to boost your chance of quitting tobacco for good. ? Use nicotine gum, patches, or lozenges. ? Ask your doctor about stop-smoking programs and medicines. ? Keep trying. In addition to reducing your risk of diseases in the future, you will notice some benefits soon after you stop using tobacco. If you have shortness of breath or asthma symptoms, they will likely get better within a few weeks after you quit. · Limit how much alcohol you drink. Moderate amounts of alcohol (up to 2 drinks a day for men, 1 drink a day for women) are okay. But drinking too much can lead to liver problems, high blood pressure, and other health problems. Family health  If you have a family, there are many things you can do together to improve your health. · Eat meals together as a family as often as possible. · Eat healthy foods. This includes fruits, vegetables, lean meats and dairy, and whole grains. · Include your family in your fitness plan. Most people think of activities such as jogging or tennis as the way to fitness, but there are many ways you and your family can be more active. Anything that makes you breathe hard and gets your heart pumping is exercise. Here are some tips:  ? Walk to do errands or to take your child to school or the bus.  ? Go for a family bike ride after dinner instead of watching TV. Where can you learn more? Go to http://yuly-jaime.info/. Enter W115 in the search box to learn more about \"A Healthy Lifestyle: Care Instructions. \"  Current as of: May 28, 2019  Content Version: 12.2  © 7207-9118 [employer]. Care instructions adapted under license by [employer] (which disclaims liability or warranty for this information). If you have questions about a medical condition or this instruction, always ask your healthcare professional. [address] any warranty or liability for your use of this information.

## 2019-11-01 NOTE — PROGRESS NOTES
Chief Complaint   Patient presents with    Immunization/Injection     Patient presents in office today for nurse visit only for Prolia injection and flu shot. Prolia 60 MG/ ML given in left abdomen.    Lot # [id number]  [address]: 58374-215-35  : [address]  Exp: 9/30/21

## 2019-12-08 DIAGNOSIS — I48.20 CHRONIC ATRIAL FIBRILLATION (HCC): ICD-10-CM

## 2020-03-09 DIAGNOSIS — I48.20 CHRONIC ATRIAL FIBRILLATION (HCC): ICD-10-CM

## 2020-04-15 ENCOUNTER — TELEPHONE (OUTPATIENT)
Dept: FAMILY MEDICINE CLINIC | Age: 80
End: 2020-04-15

## 2020-04-15 NOTE — TELEPHONE ENCOUNTER
Patient had appointment [phone] for Prolia injection. She would like to know if it's okay to wait to get another injection longer than 6 months.  Patient's phone: [phone]

## 2020-04-15 NOTE — TELEPHONE ENCOUNTER
Patient returned call to office. Advised okay to wait on injection. Patient verbalized understanding.

## 2020-06-04 DIAGNOSIS — I48.20 CHRONIC ATRIAL FIBRILLATION (HCC): ICD-10-CM

## 2020-07-17 ENCOUNTER — TELEPHONE (OUTPATIENT)
Dept: FAMILY MEDICINE CLINIC | Age: 80
End: 2020-07-17

## 2020-07-17 NOTE — TELEPHONE ENCOUNTER
Cardio shaun Melgoza/Carmel would like a call to make sure patient doesn't need a referral as patient has appointment 07.21.2020.  CT's phone: [phone]

## 2020-08-31 DIAGNOSIS — I48.20 CHRONIC ATRIAL FIBRILLATION (HCC): ICD-10-CM

## 2020-11-30 DIAGNOSIS — I48.20 CHRONIC ATRIAL FIBRILLATION (HCC): ICD-10-CM

## 2020-12-04 DIAGNOSIS — M81.0 AGE-RELATED OSTEOPOROSIS WITHOUT CURRENT PATHOLOGICAL FRACTURE: ICD-10-CM

## 2020-12-28 DIAGNOSIS — I48.20 CHRONIC ATRIAL FIBRILLATION (HCC): ICD-10-CM

## 2021-01-25 DIAGNOSIS — I48.20 CHRONIC ATRIAL FIBRILLATION (HCC): ICD-10-CM

## 2021-02-22 DIAGNOSIS — I48.20 CHRONIC ATRIAL FIBRILLATION (HCC): ICD-10-CM

## 2021-02-23 NOTE — TELEPHONE ENCOUNTER
I have not seen this patient since April 2019. She needs to schedule follow-up and this will not be refilled again without being seen.

## 2021-03-24 DIAGNOSIS — I48.20 CHRONIC ATRIAL FIBRILLATION (HCC): ICD-10-CM

## 2021-03-25 DIAGNOSIS — I48.20 CHRONIC ATRIAL FIBRILLATION (HCC): ICD-10-CM

## 2021-03-25 NOTE — TELEPHONE ENCOUNTER
Attempted to call patient however number on file is not a working number. Will mail out another letter.

## 2021-04-12 LAB — SARS-COV-2, NAA: NEGATIVE

## 2021-04-26 DIAGNOSIS — I48.20 CHRONIC ATRIAL FIBRILLATION (HCC): ICD-10-CM

## 2021-04-27 NOTE — TELEPHONE ENCOUNTER
This medication is important however the patient has been mailed multiple letters and has not followed up.   I am sending in 1 last 30-day prescription

## 2021-06-08 DIAGNOSIS — I48.20 CHRONIC ATRIAL FIBRILLATION (HCC): ICD-10-CM

## 2021-06-22 ENCOUNTER — OFFICE VISIT (OUTPATIENT)
Dept: FAMILY MEDICINE CLINIC | Age: 81
End: 2021-06-22
Payer: MEDICARE

## 2021-06-22 VITALS
BODY MASS INDEX: 30.61 KG/M2 | TEMPERATURE: 98.7 F | HEART RATE: 64 BPM | DIASTOLIC BLOOD PRESSURE: 70 MMHG | SYSTOLIC BLOOD PRESSURE: 162 MMHG | HEIGHT: 67 IN | OXYGEN SATURATION: 99 % | WEIGHT: 195 LBS

## 2021-06-22 DIAGNOSIS — I48.20 CHRONIC ATRIAL FIBRILLATION (HCC): ICD-10-CM

## 2021-06-22 DIAGNOSIS — R09.02 HYPOXIA: Primary | ICD-10-CM

## 2021-06-22 DIAGNOSIS — I48.91 ATRIAL FIBRILLATION WITH RAPID VENTRICULAR RESPONSE (HCC): ICD-10-CM

## 2021-06-22 DIAGNOSIS — E78.00 ELEVATED CHOLESTEROL: ICD-10-CM

## 2021-06-22 DIAGNOSIS — I10 ESSENTIAL HYPERTENSION: ICD-10-CM

## 2021-06-22 DIAGNOSIS — Z95.2 H/O AORTIC VALVE REPLACEMENT: ICD-10-CM

## 2021-06-22 DIAGNOSIS — M81.0 AGE-RELATED OSTEOPOROSIS WITHOUT CURRENT PATHOLOGICAL FRACTURE: ICD-10-CM

## 2021-06-22 PROBLEM — I77.9 CAROTID ARTERIAL DISEASE (HCC): Status: ACTIVE | Noted: 2021-06-22

## 2021-06-22 LAB
25(OH)D3 SERPL-MCNC: 35.3 NG/ML (ref 30–100)
ALBUMIN SERPL-MCNC: 3.2 G/DL (ref 3.5–5)
ALBUMIN/GLOB SERPL: 0.7 {RATIO} (ref 1.1–2.2)
ALP SERPL-CCNC: 116 U/L (ref 45–117)
ALT SERPL-CCNC: 23 U/L (ref 12–78)
ANION GAP SERPL CALC-SCNC: 5 MMOL/L (ref 5–15)
AST SERPL-CCNC: 22 U/L (ref 15–37)
BASOPHILS # BLD: 0 K/UL (ref 0–0.1)
BASOPHILS NFR BLD: 0 % (ref 0–1)
BILIRUB SERPL-MCNC: 0.5 MG/DL (ref 0.2–1)
BUN SERPL-MCNC: 23 MG/DL (ref 6–20)
BUN/CREAT SERPL: 36 (ref 12–20)
CALCIUM SERPL-MCNC: 9.8 MG/DL (ref 8.5–10.1)
CHLORIDE SERPL-SCNC: 100 MMOL/L (ref 97–108)
CHOLEST SERPL-MCNC: 127 MG/DL
CO2 SERPL-SCNC: 31 MMOL/L (ref 21–32)
CREAT SERPL-MCNC: 0.64 MG/DL (ref 0.55–1.02)
DIFFERENTIAL METHOD BLD: ABNORMAL
EOSINOPHIL # BLD: 0.1 K/UL (ref 0–0.4)
EOSINOPHIL NFR BLD: 2 % (ref 0–7)
ERYTHROCYTE [DISTWIDTH] IN BLOOD BY AUTOMATED COUNT: 16.6 % (ref 11.5–14.5)
GLOBULIN SER CALC-MCNC: 4.9 G/DL (ref 2–4)
GLUCOSE SERPL-MCNC: 96 MG/DL (ref 65–100)
HCT VFR BLD AUTO: 38.1 % (ref 35–47)
HDLC SERPL-MCNC: 52 MG/DL
HDLC SERPL: 2.4 {RATIO} (ref 0–5)
HGB BLD-MCNC: 11.5 G/DL (ref 11.5–16)
IMM GRANULOCYTES # BLD AUTO: 0 K/UL
IMM GRANULOCYTES NFR BLD AUTO: 0 %
LDLC SERPL CALC-MCNC: 52.2 MG/DL (ref 0–100)
LYMPHOCYTES # BLD: 0.8 K/UL (ref 0.8–3.5)
LYMPHOCYTES NFR BLD: 13 % (ref 12–49)
MCH RBC QN AUTO: 27.8 PG (ref 26–34)
MCHC RBC AUTO-ENTMCNC: 30.2 G/DL (ref 30–36.5)
MCV RBC AUTO: 92 FL (ref 80–99)
METAMYELOCYTES NFR BLD MANUAL: 1 %
MONOCYTES # BLD: 0.8 K/UL (ref 0–1)
MONOCYTES NFR BLD: 14 % (ref 5–13)
MYELOCYTES NFR BLD MANUAL: 1 %
NEUTS SEG # BLD: 4.1 K/UL (ref 1.8–8)
NEUTS SEG NFR BLD: 69 % (ref 32–75)
NRBC # BLD: 0 K/UL (ref 0–0.01)
NRBC BLD-RTO: 0 PER 100 WBC
PLATELET # BLD AUTO: 255 K/UL (ref 150–400)
PMV BLD AUTO: 10 FL (ref 8.9–12.9)
POTASSIUM SERPL-SCNC: 4.8 MMOL/L (ref 3.5–5.1)
PROT SERPL-MCNC: 8.1 G/DL (ref 6.4–8.2)
RBC # BLD AUTO: 4.14 M/UL (ref 3.8–5.2)
RBC MORPH BLD: ABNORMAL
RBC MORPH BLD: ABNORMAL
SODIUM SERPL-SCNC: 136 MMOL/L (ref 136–145)
TRIGL SERPL-MCNC: 114 MG/DL (ref ?–150)
TSH SERPL DL<=0.05 MIU/L-ACNC: 3.25 UIU/ML (ref 0.36–3.74)
VLDLC SERPL CALC-MCNC: 22.8 MG/DL
WBC # BLD AUTO: 6 K/UL (ref 3.6–11)

## 2021-06-22 PROCEDURE — 1090F PRES/ABSN URINE INCON ASSESS: CPT | Performed by: STUDENT IN AN ORGANIZED HEALTH CARE EDUCATION/TRAINING PROGRAM

## 2021-06-22 PROCEDURE — G8754 DIAS BP LESS 90: HCPCS | Performed by: STUDENT IN AN ORGANIZED HEALTH CARE EDUCATION/TRAINING PROGRAM

## 2021-06-22 PROCEDURE — 1101F PT FALLS ASSESS-DOCD LE1/YR: CPT | Performed by: STUDENT IN AN ORGANIZED HEALTH CARE EDUCATION/TRAINING PROGRAM

## 2021-06-22 PROCEDURE — 99214 OFFICE O/P EST MOD 30 MIN: CPT | Performed by: STUDENT IN AN ORGANIZED HEALTH CARE EDUCATION/TRAINING PROGRAM

## 2021-06-22 PROCEDURE — G8427 DOCREV CUR MEDS BY ELIG CLIN: HCPCS | Performed by: STUDENT IN AN ORGANIZED HEALTH CARE EDUCATION/TRAINING PROGRAM

## 2021-06-22 PROCEDURE — G8753 SYS BP > OR = 140: HCPCS | Performed by: STUDENT IN AN ORGANIZED HEALTH CARE EDUCATION/TRAINING PROGRAM

## 2021-06-22 PROCEDURE — G8510 SCR DEP NEG, NO PLAN REQD: HCPCS | Performed by: STUDENT IN AN ORGANIZED HEALTH CARE EDUCATION/TRAINING PROGRAM

## 2021-06-22 PROCEDURE — G8536 NO DOC ELDER MAL SCRN: HCPCS | Performed by: STUDENT IN AN ORGANIZED HEALTH CARE EDUCATION/TRAINING PROGRAM

## 2021-06-22 PROCEDURE — G8417 CALC BMI ABV UP PARAM F/U: HCPCS | Performed by: STUDENT IN AN ORGANIZED HEALTH CARE EDUCATION/TRAINING PROGRAM

## 2021-06-22 RX ORDER — ASPIRIN 81 MG/1
TABLET ORAL DAILY
COMMUNITY
End: 2021-09-03

## 2021-06-22 RX ORDER — LOSARTAN POTASSIUM 100 MG/1
TABLET ORAL
COMMUNITY
Start: 2021-05-20

## 2021-06-22 RX ORDER — TRAMADOL HYDROCHLORIDE 50 MG/1
TABLET ORAL
COMMUNITY
Start: 2021-06-15

## 2021-06-22 RX ORDER — HYDROCHLOROTHIAZIDE 25 MG/1
TABLET ORAL
COMMUNITY
Start: 2021-05-20 | End: 2021-09-03

## 2021-06-22 RX ORDER — CLOPIDOGREL BISULFATE 75 MG/1
TABLET ORAL
COMMUNITY
Start: 2021-05-23

## 2021-06-22 RX ORDER — BISMUTH SUBSALICYLATE 262 MG
1 TABLET,CHEWABLE ORAL DAILY
COMMUNITY

## 2021-06-22 RX ORDER — DOCUSATE SODIUM 100 MG/1
100 CAPSULE, LIQUID FILLED ORAL 2 TIMES DAILY
COMMUNITY

## 2021-06-22 RX ORDER — ATORVASTATIN CALCIUM 40 MG/1
40 TABLET, FILM COATED ORAL
COMMUNITY

## 2021-06-22 NOTE — ASSESSMENT & PLAN NOTE
Elevated today. Reports elevations with home monitoring, uncertain of diastolic. With cardiac issues, want to ensure diastolic does not drop too low, discussed with patient goal of above 60.   No medication changes at this time, continue to monitor at home and follow-up with log and cuff/machine

## 2021-06-22 NOTE — ASSESSMENT & PLAN NOTE
DEXA in 2017 with osteoporosis. Referred for Prolia injections at that time. Has not had an injection in over a year.   Restart Prolia today  Check DEXA

## 2021-06-22 NOTE — PROGRESS NOTES
Progress Note    she is a [de-identified]y.o. year old female who presents for evalution. Chief Complaint   Patient presents with    Follow-up     hasn't been in since last year     Injection     Prolia    Irregular Heart Beat         Assessment/ Plan:   Diagnoses and all orders for this visit:    1. 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 Chronic atrial fibrillation (HCC)    3. Essential hypertension  Assessment & Plan:  Elevated today. Reports elevations with home monitoring, uncertain of diastolic. With cardiac issues, want to ensure diastolic does not drop too low, discussed with patient goal of above 60. No medication changes at this time, continue to monitor at home and follow-up with log and cuff/machine    Orders:  -     TSH 3RD GENERATION; Future  -     CBC WITH AUTOMATED DIFF; Future  -     METABOLIC PANEL, COMPREHENSIVE; Future  -     LIPID PANEL; Future    4. Age-related osteoporosis without current pathological fracture  Assessment & Plan:  DEXA in 2017 with osteoporosis. Referred for Prolia injections at that time. Has not had an injection in over a year. Restart Prolia today  Check DEXA    Orders:  -     VITAMIN D, 25 HYDROXY; Future  -     DEXA BONE DENSITY STUDY AXIAL; Future    5. Elevated cholesterol    6. Atrial fibrillation with rapid ventricular response (HCC)    7. H/O aortic valve replacement     Follow-up and Dispositions    · Return in about 4 weeks (around 7/20/2021) for follow-up on breathing and blood pressure. I have discussed the diagnosis with the patient and the intended plan as seen in the above orders. The patient has received an after-visit summary and questions were answered concerning future plans. Pt conveyed understanding of plan.     Medication Side Effects and Warnings were discussed with patient        Subjective:     Chief Complaint   Patient presents with    Follow-up     hasn't been in since last year     Injection     Prolia    Irregular Heart Beat       On O2 since TAVR in April. Using 2 LPM.  Reports that O2 drops if she takes it off to 82 at lowest.   No PFTs. Quit smoking 4.5 years ago after 60 years. Occasional cough, sometimes productive of phlegm. No current breathing medications. Currently with heart monitor on, reports that they may need to do a pacemaker   Sometimes feels lightheaded, needs to sit down. None of the symptoms since starting to wear the monitor    Home BP monitoring typically 734-209 systolic in am and comes down in the afternoon. Reviewed PmHx, RxHx, FmHx, SocHx, AllgHx and updated and dated in the chart. Review of Systems - negative except as listed above in the HPI    Objective:     Vitals:    06/22/21 0843   BP: (!) 162/70   Pulse: 64   Temp: 98.7 °F (37.1 °C)   TempSrc: Oral   SpO2: 99%   Weight: 195 lb (88.5 kg)   Height: 5' 7\" (1.702 m)       Current Outpatient Medications   Medication Sig    aspirin delayed-release 81 mg tablet Take  by mouth daily.  atorvastatin (LIPITOR) 40 mg tablet 40 mg.    clopidogreL (PLAVIX) 75 mg tab TAKE 1 TABLET BY MOUTH EVERY DAY    hydroCHLOROthiazide (HYDRODIURIL) 25 mg tablet TAKE 1 TABLET BY MOUTH EVERY DAY    losartan (COZAAR) 100 mg tablet TAKE 1 TABLET BY MOUTH EVERY DAY    traMADoL (ULTRAM) 50 mg tablet TAKE 1 TABLET BY MOUTH EVERY 6 HOURS AS NEEDED FOR PAIN SCALE 6 TO 10    docusate sodium (COLACE) 100 mg capsule Take 100 mg by mouth two (2) times a day.  multivitamin (ONE A DAY) tablet Take 1 Tablet by mouth daily.  apixaban (Eliquis) 5 mg tablet TAKE 1 TABLET BY MOUTH TWICE DAILY    denosumab (PROLIA) 60 mg/mL injection subq injection q 6 months in office     No current facility-administered medications for this visit. Physical Exam  Vitals and nursing note reviewed. Constitutional:       General: She is not in acute distress. Appearance: Normal appearance. She is not ill-appearing, toxic-appearing or diaphoretic. HENT:      Head: Normocephalic and atraumatic. Eyes:      General: No scleral icterus. Right eye: No discharge. Left eye: No discharge. Conjunctiva/sclera: Conjunctivae normal.   Cardiovascular:      Rate and Rhythm: Normal rate and regular rhythm. Pulses: Normal pulses. Heart sounds: Normal heart sounds. Pulmonary:      Effort: Pulmonary effort is normal. No respiratory distress. Breath sounds: Normal breath sounds. Abdominal:      Palpations: Abdomen is soft. Tenderness: There is no abdominal tenderness. Musculoskeletal:         General: No tenderness. Cervical back: No rigidity. Right lower leg: No edema. Left lower leg: No edema. Skin:     General: Skin is warm and dry. Neurological:      General: No focal deficit present. Mental Status: She is alert. Psychiatric:         Mood and Affect: Mood normal.         Behavior: Behavior normal.         Thought Content:  Thought content normal.         Judgment: Judgment normal.              Carl Guzman MD

## 2021-06-22 NOTE — ASSESSMENT & PLAN NOTE
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

## 2021-06-22 NOTE — PROGRESS NOTES
Allan Garcia is a [de-identified] y.o. female , id x 2(name and ). Reviewed record, history, and  medications. Chief Complaint   Patient presents with    Follow-up     hasn't been in since last year     Injection     Prolia    Irregular Heart Beat       Vitals:    21 0843   BP: (!) 162/70   Pulse: 64   Temp: 98.7 °F (37.1 °C)   TempSrc: Oral   SpO2: 99%   Weight: 195 lb (88.5 kg)   Height: 5' 7\" (1.702 m)     Prolia 60mg/ml Injection given in left arm, pt tolerated well. Lot [id number] exp  [address] 87151-150-57    Coordination of Care Questionnaire:   1) Have you been to an emergency room, urgent care, or hospitalized since your last visit? yes       2. Have seen or consulted any other health care provider since your last visit? YES      3 most recent PHQ Screens 2021   Little interest or pleasure in doing things Not at all   Feeling down, depressed, irritable, or hopeless Not at all   Total Score PHQ 2 0       Patient is accompanied by self I have received verbal consent from Allan Garcia to discuss any/all medical information while they are present in the room.

## 2021-06-23 NOTE — PROGRESS NOTES
Normal vitamin D level. Normal cholesterol panel, improved from 2 years ago. Normal electrolytes, kidney function, liver markers. Mild elevation in globulin (a protein), we can recheck this in 1 to 2 months. Normal blood countsNormal TSH which screens for thyroid problems

## 2021-06-23 NOTE — PROGRESS NOTES
Called and spoke to patient. Linwood Hyman Patient states understanding of lab results per Dr Anshu Rios:   Normal vitamin D level. Normal cholesterol panel, improved from 2 years ago. Normal electrolytes, kidney function, liver markers.  Mild elevation in globulin (a protein), we can recheck this in 1 to 2 months. Normal blood counts  Normal TSH which screens for thyroid problems    Follow up OV scheduled at patient's request: 9/3/21.

## 2021-07-14 DIAGNOSIS — I48.20 CHRONIC ATRIAL FIBRILLATION (HCC): ICD-10-CM

## 2021-07-16 ENCOUNTER — HOSPITAL ENCOUNTER (OUTPATIENT)
Dept: MAMMOGRAPHY | Age: 81
Discharge: HOME | End: 2021-07-16
Attending: STUDENT IN AN ORGANIZED HEALTH CARE EDUCATION/TRAINING PROGRAM
Payer: MEDICARE

## 2021-07-16 ENCOUNTER — HOSPITAL ENCOUNTER (OUTPATIENT)
Dept: PULMONOLOGY | Age: 81
Discharge: HOME | End: 2021-07-16
Attending: STUDENT IN AN ORGANIZED HEALTH CARE EDUCATION/TRAINING PROGRAM
Payer: MEDICARE

## 2021-07-16 VITALS — OXYGEN SATURATION: 96 %

## 2021-07-16 DIAGNOSIS — M81.0 AGE-RELATED OSTEOPOROSIS WITHOUT CURRENT PATHOLOGICAL FRACTURE: ICD-10-CM

## 2021-07-16 DIAGNOSIS — R09.02 HYPOXIA: ICD-10-CM

## 2021-07-16 PROCEDURE — 77080 DXA BONE DENSITY AXIAL: CPT

## 2021-07-16 PROCEDURE — 94726 PLETHYSMOGRAPHY LUNG VOLUMES: CPT

## 2021-07-16 PROCEDURE — 94640 AIRWAY INHALATION TREATMENT: CPT

## 2021-07-16 PROCEDURE — 94760 N-INVAS EAR/PLS OXIMETRY 1: CPT

## 2021-07-16 PROCEDURE — 94729 DIFFUSING CAPACITY: CPT

## 2021-07-16 PROCEDURE — 74011250637 HC RX REV CODE- 250/637: Performed by: STUDENT IN AN ORGANIZED HEALTH CARE EDUCATION/TRAINING PROGRAM

## 2021-07-16 PROCEDURE — 94060 EVALUATION OF WHEEZING: CPT

## 2021-07-16 PROCEDURE — 94664 DEMO&/EVAL PT USE INHALER: CPT

## 2021-07-16 RX ORDER — ALBUTEROL SULFATE 90 UG/1
4 AEROSOL, METERED RESPIRATORY (INHALATION)
Status: COMPLETED | OUTPATIENT
Start: 2021-07-16 | End: 2021-07-16

## 2021-07-16 RX ADMIN — ALBUTEROL SULFATE 4 PUFF: 90 AEROSOL, METERED RESPIRATORY (INHALATION) at 11:32

## 2021-07-20 NOTE — PROGRESS NOTES
DEXA shows osteoporosis. Should continue prolia, dose every 6 months. It is important for her not to miss a dose.   Repeat DEXA in 2 years

## 2021-07-20 NOTE — PROCEDURES
Nasir Nunez [facility] [address]  PULMONARY FUNCTION TEST    Name:  Jadyn Woods  MR#:  [id number]  :  1940  ACCOUNT #:  [de-identified]  DATE OF SERVICE:  2021      PULMONARY FUNCTION TEST    FEV1 to FVC ratio 60 with an FEV1 of 67%, total lung capacity 125%, residual volume 194%, DLCO at 31, and airway resistance at 334. Results indicate that the patient has moderate obstructive disease with severe air trapping.       Angela Durham MD      [id number]/  D:  2021 8:59  T:  2021 11:10  JOB #:  [id number]

## 2021-09-03 ENCOUNTER — OFFICE VISIT (OUTPATIENT)
Dept: FAMILY MEDICINE CLINIC | Age: 81
End: 2021-09-03
Payer: MEDICARE

## 2021-09-03 VITALS
OXYGEN SATURATION: 97 % | BODY MASS INDEX: 30.93 KG/M2 | TEMPERATURE: 97.8 F | HEIGHT: 67 IN | DIASTOLIC BLOOD PRESSURE: 70 MMHG | WEIGHT: 197.1 LBS | SYSTOLIC BLOOD PRESSURE: 136 MMHG | HEART RATE: 64 BPM

## 2021-09-03 DIAGNOSIS — R77.1 ELEVATED SERUM GLOBULIN LEVEL: ICD-10-CM

## 2021-09-03 DIAGNOSIS — M54.50 CHRONIC BILATERAL LOW BACK PAIN WITHOUT SCIATICA: Primary | ICD-10-CM

## 2021-09-03 DIAGNOSIS — G89.29 CHRONIC BILATERAL LOW BACK PAIN WITHOUT SCIATICA: Primary | ICD-10-CM

## 2021-09-03 PROCEDURE — G8754 DIAS BP LESS 90: HCPCS | Performed by: STUDENT IN AN ORGANIZED HEALTH CARE EDUCATION/TRAINING PROGRAM

## 2021-09-03 PROCEDURE — G8752 SYS BP LESS 140: HCPCS | Performed by: STUDENT IN AN ORGANIZED HEALTH CARE EDUCATION/TRAINING PROGRAM

## 2021-09-03 PROCEDURE — G8427 DOCREV CUR MEDS BY ELIG CLIN: HCPCS | Performed by: STUDENT IN AN ORGANIZED HEALTH CARE EDUCATION/TRAINING PROGRAM

## 2021-09-03 PROCEDURE — G8536 NO DOC ELDER MAL SCRN: HCPCS | Performed by: STUDENT IN AN ORGANIZED HEALTH CARE EDUCATION/TRAINING PROGRAM

## 2021-09-03 PROCEDURE — G8417 CALC BMI ABV UP PARAM F/U: HCPCS | Performed by: STUDENT IN AN ORGANIZED HEALTH CARE EDUCATION/TRAINING PROGRAM

## 2021-09-03 PROCEDURE — 1101F PT FALLS ASSESS-DOCD LE1/YR: CPT | Performed by: STUDENT IN AN ORGANIZED HEALTH CARE EDUCATION/TRAINING PROGRAM

## 2021-09-03 PROCEDURE — 1090F PRES/ABSN URINE INCON ASSESS: CPT | Performed by: STUDENT IN AN ORGANIZED HEALTH CARE EDUCATION/TRAINING PROGRAM

## 2021-09-03 PROCEDURE — 99214 OFFICE O/P EST MOD 30 MIN: CPT | Performed by: STUDENT IN AN ORGANIZED HEALTH CARE EDUCATION/TRAINING PROGRAM

## 2021-09-03 PROCEDURE — G8510 SCR DEP NEG, NO PLAN REQD: HCPCS | Performed by: STUDENT IN AN ORGANIZED HEALTH CARE EDUCATION/TRAINING PROGRAM

## 2021-09-03 RX ORDER — FUROSEMIDE 20 MG/1
TABLET ORAL DAILY
COMMUNITY

## 2021-09-03 RX ORDER — HYDRALAZINE HYDROCHLORIDE 50 MG/1
25 TABLET, FILM COATED ORAL 3 TIMES DAILY
COMMUNITY

## 2021-09-03 RX ORDER — PREDNISONE 10 MG/1
TABLET ORAL
Qty: 1 DOSE PACK | Refills: 0 | Status: SHIPPED | OUTPATIENT
Start: 2021-09-03

## 2021-09-03 RX ORDER — LIDOCAINE 50 MG/G
PATCH TOPICAL
Qty: 30 EACH | Refills: 2 | Status: SHIPPED | OUTPATIENT
Start: 2021-09-03

## 2021-09-03 NOTE — PROGRESS NOTES
Progress Note    she is a [de-identified]y.o. year old female who presents for evalution. Chief Complaint   Patient presents with    Blood Pressure Check     f/up. states that she has been seeing a Dr. Cesar Jaeger     Breathing Problem     f/up    Back Pain     would like to know if she can have something for back pain         Assessment/ Plan:   Diagnoses and all orders for this visit:    1. Chronic bilateral low back pain without sciatica  Assessment & Plan:  History concerning for spinal stenosis. No concerning neurological changes at this time. Conservative measures as below, consider MRI for failure to improve. Orders:  -     REFERRAL TO HOME HEALTH  -     predniSONE (STERAPRED DS) 10 mg dose pack; 6 Day DS taper pack as directed  -     lidocaine (LIDODERM) 5 %; Apply patch to the affected area for 12 hours a day and remove for 12 hours a day. 2. Elevated serum globulin level  Assessment & Plan:  Reeval with CMP. Further labs based on results    Orders:  -     METABOLIC PANEL, COMPREHENSIVE; Future           I have discussed the diagnosis with the patient and the intended plan as seen in the above orders. The patient has received an after-visit summary and questions were answered concerning future plans. Pt conveyed understanding of plan. Medication Side Effects and Warnings were discussed with patient        Subjective:     Chief Complaint   Patient presents with    Blood Pressure Check     f/up. states that she has been seeing a Dr. Cesar Jaeger     Breathing Problem     f/up    Back Pain     would like to know if she can have something for back pain     Reports bp is up and down at home. Presents with cuff today. Home log shows 120s-160s / 69-89 early august.    August 18th started hydralazine, increased dose Monday to 50 mg tid   BP 120s-140s / 64-82; Then 120s-130s/70s  Reports salty meal last night  Involved in a program to monitor and report home bp monitoring.     Back pain is described as Smitha Dugan can't stand up\",  Took some tramadol that she had at home and it helped her be more active  Pain in lower back, moves between left and right. No radiation down the legs. No FND, incontinence or retention. No previous injuries or surgeries  Tylenol doesn't work. Can't take aleve. Can't drive anymore. Daughter has to drive her. Reviewed PmHx, RxHx, FmHx, SocHx, AllgHx and updated and dated in the chart. Review of Systems - negative except as listed above in the HPI    Objective:     Vitals:    09/03/21 1528 09/03/21 1544   BP: (!) 181/95 136/70   Pulse: 64    Temp: 97.8 °F (36.6 °C)    SpO2: 97%    Weight: 197 lb 1.6 oz (89.4 kg)    Height: 5' 7\" (1.702 m)        Current Outpatient Medications   Medication Sig    hydrALAZINE (APRESOLINE) 50 mg tablet Take 25 mg by mouth three (3) times daily.  furosemide (LASIX) 20 mg tablet Take  by mouth daily.  predniSONE (STERAPRED DS) 10 mg dose pack 6 Day DS taper pack as directed    lidocaine (LIDODERM) 5 % Apply patch to the affected area for 12 hours a day and remove for 12 hours a day.  apixaban (Eliquis) 5 mg tablet TAKE 1 TABLET BY MOUTH TWICE DAILY    atorvastatin (LIPITOR) 40 mg tablet 40 mg.    clopidogreL (PLAVIX) 75 mg tab TAKE 1 TABLET BY MOUTH EVERY DAY    losartan (COZAAR) 100 mg tablet TAKE 1 TABLET BY MOUTH EVERY DAY    docusate sodium (COLACE) 100 mg capsule Take 100 mg by mouth two (2) times a day.  multivitamin (ONE A DAY) tablet Take 1 Tablet by mouth daily.  denosumab (PROLIA) 60 mg/mL injection subq injection q 6 months in office    traMADoL (ULTRAM) 50 mg tablet TAKE 1 TABLET BY MOUTH EVERY 6 HOURS AS NEEDED FOR PAIN SCALE 6 TO 10 (Patient not taking: Reported on 9/3/2021)     No current facility-administered medications for this visit. Physical Exam  Vitals and nursing note reviewed. Constitutional:       General: She is not in acute distress. Appearance: Normal appearance.  She is not ill-appearing, toxic-appearing or diaphoretic. HENT:      Head: Normocephalic and atraumatic. Eyes:      General: No scleral icterus. Right eye: No discharge. Left eye: No discharge. Conjunctiva/sclera: Conjunctivae normal.   Cardiovascular:      Rate and Rhythm: Normal rate and regular rhythm. Pulses: Normal pulses. Heart sounds: Normal heart sounds. Pulmonary:      Effort: Pulmonary effort is normal. No respiratory distress. Breath sounds: Normal breath sounds. Musculoskeletal:         General: No swelling, tenderness or deformity. Cervical back: No rigidity. Right lower leg: No edema. Left lower leg: No edema. Comments: Back ROM limited by pain, particularly extension. Skin:     General: Skin is warm and dry. Neurological:      General: No focal deficit present. Mental Status: She is alert. Sensory: No sensory deficit. Motor: No weakness. Gait: Gait normal.      Deep Tendon Reflexes: Reflexes normal.   Psychiatric:         Mood and Affect: Mood normal.         Behavior: Behavior normal.         Thought Content:  Thought content normal.         Judgment: Judgment normal.              Meenakshi Zaragoza MD

## 2021-09-03 NOTE — PATIENT INSTRUCTIONS

## 2021-09-03 NOTE — PROGRESS NOTES
Mis Fitzgerald is a [de-identified] y.o. female , id x 2(name and ). Reviewed record, history, and  medications. Chief Complaint   Patient presents with    Blood Pressure Check     f/up. states that she has been seeing a Dr. Jaylon Abdalla     Breathing Problem     f/up    Back Pain     would like to know if she can have something for back pain       Vitals:    21 1528   BP: (!) 181/95   Pulse: 64   Temp: 97.8 °F (36.6 °C)   SpO2: 97%   Weight: 197 lb 1.6 oz (89.4 kg)   Height: 5' 7\" (1.702 m)       Coordination of Care Questionnaire:   1) Have you been to an emergency room, urgent care, or hospitalized since your last visit?   no       2. Have seen or consulted any other health care provider since your last visit? NO      3 most recent PHQ Screens 9/3/2021   Little interest or pleasure in doing things Not at all   Feeling down, depressed, irritable, or hopeless Not at all   Total Score PHQ 2 0       Patient is accompanied by self I have received verbal consent from Mis Fitzgerald to discuss any/all medical information while they are present in the room.

## 2021-09-04 LAB
ALBUMIN SERPL-MCNC: 3.4 G/DL (ref 3.5–5)
ALBUMIN/GLOB SERPL: 0.7 {RATIO} (ref 1.1–2.2)
ALP SERPL-CCNC: 107 U/L (ref 45–117)
ALT SERPL-CCNC: 32 U/L (ref 12–78)
ANION GAP SERPL CALC-SCNC: 6 MMOL/L (ref 5–15)
AST SERPL-CCNC: 26 U/L (ref 15–37)
BILIRUB SERPL-MCNC: 0.4 MG/DL (ref 0.2–1)
BUN SERPL-MCNC: 32 MG/DL (ref 6–20)
BUN/CREAT SERPL: 44 (ref 12–20)
CALCIUM SERPL-MCNC: 9.3 MG/DL (ref 8.5–10.1)
CHLORIDE SERPL-SCNC: 102 MMOL/L (ref 97–108)
CO2 SERPL-SCNC: 27 MMOL/L (ref 21–32)
CREAT SERPL-MCNC: 0.72 MG/DL (ref 0.55–1.02)
GLOBULIN SER CALC-MCNC: 5.1 G/DL (ref 2–4)
GLUCOSE SERPL-MCNC: 94 MG/DL (ref 65–100)
POTASSIUM SERPL-SCNC: 4.1 MMOL/L (ref 3.5–5.1)
PROT SERPL-MCNC: 8.5 G/DL (ref 6.4–8.2)
SODIUM SERPL-SCNC: 135 MMOL/L (ref 136–145)

## 2021-09-12 PROBLEM — G89.29 CHRONIC BILATERAL LOW BACK PAIN WITHOUT SCIATICA: Status: ACTIVE | Noted: 2021-09-12

## 2021-09-12 PROBLEM — M54.50 CHRONIC BILATERAL LOW BACK PAIN WITHOUT SCIATICA: Status: ACTIVE | Noted: 2021-09-12

## 2021-09-13 NOTE — ASSESSMENT & PLAN NOTE
History concerning for spinal stenosis. No concerning neurological changes at this time. Conservative measures as below, consider MRI for failure to improve.

## 2021-09-16 ENCOUNTER — DOCUMENTATION ONLY (OUTPATIENT)
Dept: FAMILY MEDICINE CLINIC | Age: 81
End: 2021-09-16

## 2021-09-21 ENCOUNTER — TELEPHONE (OUTPATIENT)
Dept: FAMILY MEDICINE CLINIC | Age: 81
End: 2021-09-21

## 2021-09-21 DIAGNOSIS — R77.1 ELEVATED SERUM GLOBULIN LEVEL: Primary | ICD-10-CM

## 2021-09-21 NOTE — TELEPHONE ENCOUNTER
Reviewed results of labs, persistently elevated globulin.   We will do additional testing, she will call back with preferred lab location to a fax order to

## 2021-09-21 NOTE — PROGRESS NOTES
Persistently elevated globulin, addressed in phone encounter.   Additional testing ordered Same dose  Repeat 1 week

## 2021-10-12 PROBLEM — I49.5 SSS (SICK SINUS SYNDROME) (HCC): Status: ACTIVE | Noted: 2021-10-12

## 2021-10-18 LAB
ALBUMIN SERPL ELPH-MCNC: 3.3 G/DL (ref 2.9–4.4)
ALBUMIN SERPL-MCNC: 4.1 G/DL (ref 3.7–4.7)
ALBUMIN/GLOB SERPL: 0.9 {RATIO} (ref 0.7–1.7)
ALBUMIN/GLOB SERPL: 1.2 {RATIO} (ref 1.2–2.2)
ALP SERPL-CCNC: 108 IU/L (ref 44–121)
ALPHA1 GLOB SERPL ELPH-MCNC: 0.3 G/DL (ref 0–0.4)
ALPHA2 GLOB SERPL ELPH-MCNC: 0.9 G/DL (ref 0.4–1)
ALT SERPL-CCNC: 16 IU/L (ref 0–32)
APPEARANCE UR: CLEAR
AST SERPL-CCNC: 19 IU/L (ref 0–40)
B-GLOBULIN SERPL ELPH-MCNC: 1.1 G/DL (ref 0.7–1.3)
BACTERIA #/AREA URNS HPF: ABNORMAL /[HPF]
BILIRUB SERPL-MCNC: 0.4 MG/DL (ref 0–1.2)
BILIRUB UR QL STRIP: NEGATIVE
BUN SERPL-MCNC: 21 MG/DL (ref 8–27)
BUN/CREAT SERPL: 38 (ref 12–28)
CALCIUM SERPL-MCNC: 9.2 MG/DL (ref 8.7–10.3)
CASTS URNS QL MICRO: ABNORMAL /LPF
CHLORIDE SERPL-SCNC: 103 MMOL/L (ref 96–106)
CO2 SERPL-SCNC: 25 MMOL/L (ref 20–29)
COLOR UR: YELLOW
CREAT SERPL-MCNC: 0.56 MG/DL (ref 0.57–1)
EPI CELLS #/AREA URNS HPF: ABNORMAL /HPF (ref 0–10)
GAMMA GLOB SERPL ELPH-MCNC: 1.7 G/DL (ref 0.4–1.8)
GLOBULIN SER CALC-MCNC: 3.3 G/DL (ref 1.5–4.5)
GLOBULIN SER-MCNC: 4.1 G/DL (ref 2.2–3.9)
GLUCOSE SERPL-MCNC: 86 MG/DL (ref 65–99)
GLUCOSE UR QL: NEGATIVE
HGB UR QL STRIP: NEGATIVE
IGA SERPL-MCNC: 491 MG/DL (ref 64–422)
IGG SERPL-MCNC: 1719 MG/DL (ref 586–1602)
IGM SERPL-MCNC: 213 MG/DL (ref 26–217)
INTERPRETATION SERPL IEP-IMP: ABNORMAL
KAPPA LC FREE SER-MCNC: 64.7 MG/L (ref 3.3–19.4)
KAPPA LC FREE/LAMBDA FREE SER: 1.93 {RATIO} (ref 0.26–1.65)
KETONES UR QL STRIP: NEGATIVE
LAMBDA LC FREE SERPL-MCNC: 33.5 MG/L (ref 5.7–26.3)
LEUKOCYTE ESTERASE UR QL STRIP: NEGATIVE
M PROTEIN SERPL ELPH-MCNC: ABNORMAL G/DL
MICRO URNS: ABNORMAL
NITRITE UR QL STRIP: NEGATIVE
PH UR STRIP: 6 [PH] (ref 5–7.5)
PLEASE NOTE:, 149534: ABNORMAL
POTASSIUM SERPL-SCNC: 4.5 MMOL/L (ref 3.5–5.2)
PROT SERPL-MCNC: 7.4 G/DL (ref 6–8.5)
PROT UR QL STRIP: ABNORMAL
RBC #/AREA URNS HPF: ABNORMAL /HPF (ref 0–2)
SODIUM SERPL-SCNC: 139 MMOL/L (ref 134–144)
SP GR UR: 1.02 (ref 1–1.03)
UROBILINOGEN UR STRIP-MCNC: 0.2 MG/DL (ref 0.2–1)
WBC #/AREA URNS HPF: ABNORMAL /HPF (ref 0–5)

## 2021-10-27 ENCOUNTER — TELEPHONE (OUTPATIENT)
Dept: FAMILY MEDICINE CLINIC | Age: 81
End: 2021-10-27

## 2021-10-27 DIAGNOSIS — R77.1 ELEVATED SERUM GLOBULIN LEVEL: Primary | ICD-10-CM

## 2021-10-27 NOTE — TELEPHONE ENCOUNTER
Called and LM to call back regarding lab results: Additional blood work to examine protein in the blood showed specific elevations in certain types of proteins called immunoglobulins and light chains. I recommend that she establish with hematology for further evaluation. I have placed a referral to Dr. Mega Pitt at [address] [facility].   She can call his office at [phone]

## 2021-10-27 NOTE — PROGRESS NOTES
Protein electrophoresis with increase in IgG, IgA, and light chains. Recommend establish with hematology for further evaluation. Normal CMPMild proteinuria

## 2021-11-05 NOTE — TELEPHONE ENCOUNTER
Pt id x 3, notified as per Dr. Luisana Garcia results below; pt had additional questions. Pt transferred to provider.

## 2021-11-05 NOTE — TELEPHONE ENCOUNTER
Patient is calling again to get lab results. Patient's phone: [phone]. She is extremely anxious about finding results.

## 2021-11-05 NOTE — TELEPHONE ENCOUNTER
Spoke with pt, encouraged to schedule follow-up for persistent back pain, she denies xray at this time. Recommended to establish with hematology.

## 2022-01-04 ENCOUNTER — TELEPHONE (OUTPATIENT)
Dept: FAMILY MEDICINE CLINIC | Age: 82
End: 2022-01-04

## 2022-01-04 DIAGNOSIS — M81.0 AGE-RELATED OSTEOPOROSIS WITHOUT CURRENT PATHOLOGICAL FRACTURE: ICD-10-CM

## 2022-01-04 NOTE — TELEPHONE ENCOUNTER
----- Message from Hawa Garcia sent at 1/4/2022  1:26 PM EST -----  Subject: Message to Provider    QUESTIONS  Information for Provider? PT would like to [employer]; Ursula Raines-   Please send RX to Pharmacy and would like to get in with a nurse.   ---------------------------------------------------------------------------  --------------  [address]  What is the best way for the office to contact you? OK to leave message on   voicemail  Preferred Call Back Phone Number? [phone]  ---------------------------------------------------------------------------  --------------  SCRIPT ANSWERS  Relationship to Patient?  Self

## 2022-01-05 NOTE — TELEPHONE ENCOUNTER
Pt id x 3, notified that med was sent in and verbalized understanding. Advised that med was sent to pharm and to advise us if she has any problems being able to pick it up because it needs to be sent to a specialty pharm or have an authorization.

## 2022-01-25 ENCOUNTER — OFFICE VISIT (OUTPATIENT)
Dept: FAMILY MEDICINE CLINIC | Age: 82
End: 2022-01-25

## 2022-01-25 VITALS — TEMPERATURE: 98.1 F

## 2022-01-25 DIAGNOSIS — M81.0 AGE-RELATED OSTEOPOROSIS WITHOUT CURRENT PATHOLOGICAL FRACTURE: Primary | ICD-10-CM

## 2022-01-25 NOTE — PROGRESS NOTES
Chief Complaint   Patient presents with    Injection     prolia      Visit Vitals  Temp 98.1 °F (36.7 °C) (Oral)       After obtaining Rama Pike's consent, and per orders of dottie, injection of prolia given by [employer] in (R) arm. Patient instructed to remain in clinic for 20 minutes afterwards, and to report any adverse reaction to me immediately. Patient did not display any adverse side effects. Patient was advsied to return in 6 month(s).      prolia 60mg/ml    NVE:[id number]  Exp: 07/24  Ndc: [phone]

## 2022-02-03 ENCOUNTER — OFFICE VISIT (OUTPATIENT)
Dept: ONCOLOGY | Age: 82
End: 2022-02-03
Payer: MEDICARE

## 2022-02-03 VITALS
HEART RATE: 86 BPM | HEIGHT: 67 IN | DIASTOLIC BLOOD PRESSURE: 68 MMHG | WEIGHT: 203.2 LBS | BODY MASS INDEX: 31.89 KG/M2 | SYSTOLIC BLOOD PRESSURE: 200 MMHG | OXYGEN SATURATION: 93 % | RESPIRATION RATE: 16 BRPM

## 2022-02-03 DIAGNOSIS — R77.1 ABNORMALITY OF GLOBULIN: Primary | ICD-10-CM

## 2022-02-03 DIAGNOSIS — M54.50 CHRONIC BILATERAL LOW BACK PAIN WITHOUT SCIATICA: ICD-10-CM

## 2022-02-03 DIAGNOSIS — G89.29 CHRONIC BILATERAL LOW BACK PAIN WITHOUT SCIATICA: ICD-10-CM

## 2022-02-03 PROCEDURE — G8753 SYS BP > OR = 140: HCPCS | Performed by: INTERNAL MEDICINE

## 2022-02-03 PROCEDURE — G8754 DIAS BP LESS 90: HCPCS | Performed by: INTERNAL MEDICINE

## 2022-02-03 PROCEDURE — G8427 DOCREV CUR MEDS BY ELIG CLIN: HCPCS | Performed by: INTERNAL MEDICINE

## 2022-02-03 PROCEDURE — 1090F PRES/ABSN URINE INCON ASSESS: CPT | Performed by: INTERNAL MEDICINE

## 2022-02-03 PROCEDURE — G8417 CALC BMI ABV UP PARAM F/U: HCPCS | Performed by: INTERNAL MEDICINE

## 2022-02-03 PROCEDURE — 99204 OFFICE O/P NEW MOD 45 MIN: CPT | Performed by: INTERNAL MEDICINE

## 2022-02-03 PROCEDURE — G8536 NO DOC ELDER MAL SCRN: HCPCS | Performed by: INTERNAL MEDICINE

## 2022-02-03 PROCEDURE — G8510 SCR DEP NEG, NO PLAN REQD: HCPCS | Performed by: INTERNAL MEDICINE

## 2022-02-03 PROCEDURE — 1101F PT FALLS ASSESS-DOCD LE1/YR: CPT | Performed by: INTERNAL MEDICINE

## 2022-02-03 RX ORDER — HYDROCHLOROTHIAZIDE 25 MG/1
TABLET ORAL
COMMUNITY
Start: 2022-01-30

## 2022-02-03 NOTE — PROGRESS NOTES
[facility] [place] [facility]  [address] [facility], [address] [place] [address]  W: [phone]  F: [phone] Patient ID  Name: Edi Burnham  YOB: 1940  MRN: [id number]  Referring Provider:   Saida Cline MD  [address]  [address] [place] [address]  Primary Care Provider:   Len Webb DO       HEMATOLOGY/MEDICAL ONCOLOGY  NOTE   Date of Visit: 02/03/22  Reason for Evaluation:     Chief Complaint   Patient presents with    New Patient     Edi Burnham is a pleasant [de-identified]year old woman who presents as a new patient. She reports back pain     Subjective:     History of Present Illness:     Edi Burnham is a [de-identified] y.o. F who presents for an initial evaluation for AN ELEVATED SERUM GLOBULIN LEVEL. Patient reports dealing with has chronic back pain for the past 6 years. She has never had a work-up for this she tells me but states she has osteoporosis. . Patient reports adequate oral intake of food and hydration. Patient denies any bowel or bladder problems. Patient reports a history of pain. Patient reports shortness of breath on exertion. She had a heart valve replaced and since that time she has not been weaned off oxygen.  -She is without any significant loss of activities of daily living. She denies any major bleeding issues but with wearing oxygen she attributes that to her nasal cannula use. -She uses a walker to ambulate. She states that she cannot traverse steps by herself due to her walker dependency.      Past Medical History:   Diagnosis Date    A-fib [facility])       Past Surgical History:   Procedure Laterality Date    HX AORTIC VALVE REPLACEMENT  04/13/2021    HX CAROTID STENT  06/17/2021    HX CATARACT REMOVAL Bilateral 08/07/2018      Social History     Tobacco Use    Smoking status: Former Smoker    Smokeless tobacco: Never Used   Substance Use Topics    Alcohol use: No      Family History   Problem Relation Age of Onset    Heart Disease Mother      Current Outpatient Medications   Medication Sig    hydroCHLOROthiazide (HYDRODIURIL) 25 mg tablet     denosumab (PROLIA) 60 mg/mL injection subq injection to be administered in office every 6 months    hydrALAZINE (APRESOLINE) 50 mg tablet Take 25 mg by mouth three (3) times daily.  furosemide (LASIX) 20 mg tablet Take  by mouth daily.  predniSONE (STERAPRED DS) 10 mg dose pack 6 Day DS taper pack as directed    lidocaine (LIDODERM) 5 % Apply patch to the affected area for 12 hours a day and remove for 12 hours a day.  apixaban (Eliquis) 5 mg tablet TAKE 1 TABLET BY MOUTH TWICE DAILY    atorvastatin (LIPITOR) 40 mg tablet 40 mg.    clopidogreL (PLAVIX) 75 mg tab TAKE 1 TABLET BY MOUTH EVERY DAY    losartan (COZAAR) 100 mg tablet TAKE 1 TABLET BY MOUTH EVERY DAY    traMADoL (ULTRAM) 50 mg tablet TAKE 1 TABLET BY MOUTH EVERY 6 HOURS AS NEEDED FOR PAIN SCALE 6 TO 10    docusate sodium (COLACE) 100 mg capsule Take 100 mg by mouth two (2) times a day.  multivitamin (ONE A DAY) tablet Take 1 Tablet by mouth daily. No current facility-administered medications for this visit. No Known Allergies     Review of Systems provided by:patient  General: denies fever, denies chills, denies night sweats and reports fatigue  Eyes: denies any acute vision loss and denies any eye pain  HEENT: reports a history of epistaxis and denies trouble swallowing  Cardio: denies any chest pain, reports leg swelling and wears compression socks. Resp: denies any hemoptysis, reports shortness of breath on exertion. Abdomen: denies any abdominal pain, denies any nausea, denies any vomiting and denies any diarrhea Patient denies any hematochezia. and Patient denies any melena. MSK: denies any myalgias, reports arthralgias and has chronci low back pain.   Skin: denies any rash and denies any itching  Lymph: denies any lymph node enlargement and denies any lymph node tenderness  Neuro: denies any headache, denies any seizure history and reports a history of gait disturbance  Psych: denies depression, denies suicidal ideations, denies homicidal ideations and reports anxiety      Objective:     Visit Vitals  BP (!) 200/66   Pulse 86   Resp 16   Ht 5' 7\" (1.702 m)   Wt 203 lb 3.2 oz (92.2 kg)   SpO2 93%   BMI 31.83 kg/m²     ECOG PS: 1- Restricted in physically strenuous activity but ambulatory and able to carry out work of a light or sedentary nature, e.g., light house work, office work. Physical Exam  Constitutional: No acute distress. , Non-toxic appearance. and Non-diaphoretic. HENT: Normocephalic and atraumatic head. and Wearing a mask during COVID-19 precautions. Eyes: Normal Conjunctivae. and Anicteric sclerae. Cardiovascular: S1,S2 auscultated., No pitting edema., No friction rub. and No gallops auscultated. Pulmonary: Normal Respiratory Effort., No wheezing. and No rhonchi. Abdominal: Normal bowel sounds. , Soft Abdomen to palpation. , No abdominal tenderness., No guarding. and No rebound tenderness. Skin: No jaundice. and No rash. Musculoskeletal: No muscle pain on palpation. No temporal muscle wasting on inspection. Lymph: No cervical, supraclavicular,or axillary lymph node enlargement or tenderness. Neurological: Alert and oriented. and No tremor on inspection. Uses a front-wheeled walker. Psychiatric: mood normal. normal speech rate and normal affect    Results:     I personally reviewed Epic EHR labs/results below:   Lab Results   Component Value Date/Time    WBC 6.0 06/22/2021 09:26 AM    HGB 11.5 06/22/2021 09:26 AM    HCT 38.1 06/22/2021 09:26 AM    PLATELET 015 77/13/4018 09:26 AM    MCV 92.0 06/22/2021 09:26 AM    ABS.  NEUTROPHILS 4.1 06/22/2021 09:26 AM     Lab Results   Component Value Date/Time    Sodium 139 10/13/2021 04:13 PM    Potassium 4.5 10/13/2021 04:13 PM    Chloride 103 10/13/2021 04:13 PM    CO2 25 10/13/2021 04:13 PM    Glucose 86 10/13/2021 04:13 PM    BUN 21 10/13/2021 04:13 PM    Creatinine 0.56 (L) 10/13/2021 04:13 PM    GFR est  10/13/2021 04:13 PM    GFR est non-AA 88 10/13/2021 04:13 PM    Calcium 9.2 10/13/2021 04:13 PM     Lab Results   Component Value Date/Time    Bilirubin, total 0.4 10/13/2021 04:13 PM    ALT (SGPT) 16 10/13/2021 04:13 PM    Alk. phosphatase 108 10/13/2021 04:13 PM    Protein, total 7.4 10/13/2021 04:13 PM    Albumin 4.1 10/13/2021 04:13 PM    Globulin 5.1 (H) 09/03/2021 04:00 PM     I personally reviewed pertinent referral notes:     Assessment and Recommendations:     Diagnoses and all orders for this visit:    1. Abnormality of globulin  -     PROTEIN ELECTROPHORESIS + FELICIA, UR, 24HR; Future  -     PERIPHERAL SMEAR; Future  -     FREE LIGHT CHAINS, KAPPA/LAMBDA, QT; Future  -     SPEP AND FELICIA, SERUM; Future  -     METABOLIC PANEL, BASIC; Future  -     CBC WITH AUTOMATED DIFF; Future  -Discussed with patient that we will order a basic work-up for multiple myeloma. We discussed that her elevated globulin could be nonspecific. 2. Chronic bilateral low back pain without sciatica  -At this time I will hold off on ordering any skeletal survey since we do not know of any clear monoclonal paraprotein issue yet. However, if she has MGUS then she would need a skeletal survey. Follow-up and Dispositions    · Return in about 3 weeks (around 2/24/2022).            Mike Barakat MD  Hematology/Medical Oncology Provider  [facility]  P: [phone]    Signed By:   Joanna Youssef MD

## 2022-02-03 NOTE — PROGRESS NOTES
Chief Complaint   Patient presents with    New Patient     Toby Dozier is a pleasant [de-identified]year old woman who presents as a new patient.  She reports back pain

## 2022-02-03 NOTE — LETTER
2/8/2022    Patient: Ishmael Mason   YOB: 1940   Date of Visit: 2/3/2022     Rosario Roy DO  [address] [facility]  [address] [place] [address]  Via In [facility] [address], [address] [facility] [address]  [address]    Dear Rosario Roy, Wei Uribe MD,      Thank you for referring Ms. Ishmael Mason to [address] [place] [address] for evaluation. My notes for this consultation are attached. If you have questions, please do not hesitate to call me. I look forward to following your patient along with you.       Sincerely,    Sharmila Singletary MD

## 2022-03-02 ENCOUNTER — OFFICE VISIT (OUTPATIENT)
Dept: ONCOLOGY | Age: 82
End: 2022-03-02
Payer: MEDICARE

## 2022-03-02 VITALS
HEART RATE: 60 BPM | HEIGHT: 67 IN | WEIGHT: 201.6 LBS | DIASTOLIC BLOOD PRESSURE: 57 MMHG | OXYGEN SATURATION: 97 % | BODY MASS INDEX: 31.64 KG/M2 | SYSTOLIC BLOOD PRESSURE: 163 MMHG | RESPIRATION RATE: 16 BRPM

## 2022-03-02 DIAGNOSIS — D64.9 NORMOCYTIC ANEMIA: ICD-10-CM

## 2022-03-02 DIAGNOSIS — R76.8 ELEVATED SERUM IMMUNOGLOBULIN FREE LIGHT CHAINS: ICD-10-CM

## 2022-03-02 DIAGNOSIS — R77.1 ELEVATED SERUM GLOBULIN LEVEL: Primary | ICD-10-CM

## 2022-03-02 PROCEDURE — G8427 DOCREV CUR MEDS BY ELIG CLIN: HCPCS | Performed by: INTERNAL MEDICINE

## 2022-03-02 PROCEDURE — 99212 OFFICE O/P EST SF 10 MIN: CPT | Performed by: INTERNAL MEDICINE

## 2022-03-02 PROCEDURE — G8754 DIAS BP LESS 90: HCPCS | Performed by: INTERNAL MEDICINE

## 2022-03-02 PROCEDURE — G8536 NO DOC ELDER MAL SCRN: HCPCS | Performed by: INTERNAL MEDICINE

## 2022-03-02 PROCEDURE — 1101F PT FALLS ASSESS-DOCD LE1/YR: CPT | Performed by: INTERNAL MEDICINE

## 2022-03-02 PROCEDURE — G8753 SYS BP > OR = 140: HCPCS | Performed by: INTERNAL MEDICINE

## 2022-03-02 PROCEDURE — G8510 SCR DEP NEG, NO PLAN REQD: HCPCS | Performed by: INTERNAL MEDICINE

## 2022-03-02 PROCEDURE — 1090F PRES/ABSN URINE INCON ASSESS: CPT | Performed by: INTERNAL MEDICINE

## 2022-03-02 PROCEDURE — G8417 CALC BMI ABV UP PARAM F/U: HCPCS | Performed by: INTERNAL MEDICINE

## 2022-03-02 NOTE — LETTER
3/7/2022    Patient: Lance Chin   YOB: 1940   Date of Visit: 3/2/2022     Arleen Young[address]  [address] [place] [address]    Dear Arleen Young DO,      Thank you for referring Ms. Lance Chin to [facility] for evaluation. My notes for this consultation are attached. If you have questions, please do not hesitate to call me. I look forward to following your patient along with you.       Sincerely,    Fidelia Carreno MD

## 2022-03-02 NOTE — PATIENT INSTRUCTIONS
Orders Only on 02/03/2022   Component Date Value Ref Range Status    WBC 02/03/2022 5.7  3.6 - 11.0 K/uL Final    RBC 02/03/2022 3.85  3.80 - 5.20 M/uL Final    HGB 02/03/2022 11.2* 11.5 - 16.0 g/dL Final    HCT 02/03/2022 36.4  35.0 - 47.0 % Final    MCV 02/03/2022 94.5  80.0 - 99.0 FL Final    MCH 02/03/2022 29.1  26.0 - 34.0 PG Final    MCHC 02/03/2022 30.8  30.0 - 36.5 g/dL Final    RDW 02/03/2022 16.1* 11.5 - 14.5 % Final    PLATELET 93/44/9875 298  150 - 400 K/uL Final    MPV 02/03/2022 9.6  8.9 - 12.9 FL Final    NRBC 02/03/2022 0.0  0  WBC Final    ABSOLUTE NRBC 02/03/2022 0.00  0.00 - 0.01 K/uL Final    NEUTROPHILS 02/03/2022 69  32 - 75 % Final    LYMPHOCYTES 02/03/2022 15  12 - 49 % Final    MONOCYTES 02/03/2022 12  5 - 13 % Final    EOSINOPHILS 02/03/2022 2  0 - 7 % Final    BASOPHILS 02/03/2022 1  0 - 1 % Final    IMMATURE GRANULOCYTES 02/03/2022 1* 0.0 - 0.5 % Final    ABS. NEUTROPHILS 02/03/2022 3.9  1.8 - 8.0 K/UL Final    ABS. LYMPHOCYTES 02/03/2022 0.8  0.8 - 3.5 K/UL Final    ABS. MONOCYTES 02/03/2022 0.7  0.0 - 1.0 K/UL Final    ABS. EOSINOPHILS 02/03/2022 0.1  0.0 - 0.4 K/UL Final    ABS. BASOPHILS 02/03/2022 0.1  0.0 - 0.1 K/UL Final    ABS. IMM.  GRANS. 02/03/2022 0.1* 0.00 - 0.04 K/UL Final    DF 02/03/2022 AUTOMATED    Final    Sodium 02/03/2022 136  136 - 145 mmol/L Final    Potassium 02/03/2022 4.4  3.5 - 5.1 mmol/L Final    Chloride 02/03/2022 103  97 - 108 mmol/L Final    CO2 02/03/2022 30  21 - 32 mmol/L Final    Anion gap 02/03/2022 3* 5 - 15 mmol/L Final    Glucose 02/03/2022 109* 65 - 100 mg/dL Final    BUN 02/03/2022 25* 6 - 20 MG/DL Final    Creatinine 02/03/2022 0.73  0.55 - 1.02 MG/DL Final    BUN/Creatinine ratio 02/03/2022 34* 12 - 20   Final    GFR est AA 02/03/2022 >60  >60 ml/min/1.73m2 Final    GFR est non-AA 02/03/2022 >60  >60 ml/min/1.73m2 Final    Comment: Estimated GFR is calculated using the IDMS-traceable Modification of Diet in  Renal Disease (MDRD) Study equation, reported for both  Americans  (GFRAA) and non- Americans (GFRNA), and normalized to 1.73m2 body  surface area. The physician must decide which value applies to the patient.  Calcium 02/03/2022 9.2  8.5 - 10.1 MG/DL Final    Immunoglobulin G, Qt. 02/03/2022 1,744* 586 - 1,602 mg/dL Final    Immunoglobulin A, Qt. 02/03/2022 444* 64 - 422 mg/dL Final    Immunoglobulin M, Qt. 02/03/2022 174  26 - 217 mg/dL Final    Protein, total 02/03/2022 7.3  6.0 - 8.5 g/dL Final    Albumin 02/03/2022 3.2  2.9 - 4.4 g/dL Final    Alpha-1-Globulin 02/03/2022 0.3  0.0 - 0.4 g/dL Final    Alpha-2-Globulin 02/03/2022 0.9  0.4 - 1.0 g/dL Final    Beta Globulin 02/03/2022 1.1  0.7 - 1.3 g/dL Final    Gamma Globulin 02/03/2022 1.8  0.4 - 1.8 g/dL Final    M-Sylvester 02/03/2022 Not Observed  Not Observed g/dL Final    Globulin, total 02/03/2022 4.1* 2.2 - 3.9 g/dL Final    A/G ratio 02/03/2022 0.8  0.7 - 1.7   Final    Immunofixation Result 02/03/2022 Comment*   Final    Comment: (NOTE)  Polyclonal increase detected in one or more immunoglobulins.       Free Kappa Lt Chains, serum 02/03/2022 48.4* 3.3 - 19.4 mg/L Final    Free Lambda Lt Chains, serum 02/03/2022 32.0* 5.7 - 26.3 mg/L Final    Kappa/Lambda ratio, serum 02/03/2022 1.51  0.26 - 1.65   Final    Comment: (NOTE)  Performed At: [facility] & [address] [id number]  Ernestina Garcia MD AD:[id number]      PERIPHERAL SMEAR 02/03/2022 (NOTE)   Final    Comment: [id number]

## 2022-03-02 NOTE — PROGRESS NOTES
[facility] [place] at [address], [facility] [phone]  F: [phone] Patient ID  Name: Larisa Paz  YOB: 1940  MRN: [id number]  Referring Provider:   No referring provider defined for this encounter. Primary Care Provider:   Netta Dobbins DO       HEMATOLOGY/MEDICAL ONCOLOGY  NOTE   Date of Visit: 22  Reason for Evaluation:     Chief Complaint   Patient presents with    Follow-up     Larisa Paz is a pleasant [de-identified]year old woman who presents as a follow up. She denies pain     Subjective:     History of Present Illness:     Larisa Paz is a [de-identified] y.o. F who presents for a follow-up evaluation for abnormal globulin. Patient overall reports feeling stable. She reports that she is feeling well overall but dealing with her chronic back pain issues. Reports that tylenolo seems to help control the pain but steroid use seems to help the most. Patient reports a history of pain. Patient denies any shortness of breath. Patient reports exertional fatigue. Patient reports using gait assistance. -She is on oxygen.  She is on 2 LPM Oxygen but at home uses 3LPM.    Shortness of Breath:Grade 1  Urinary Leakage/Incontinence:Grade 1      Past Medical History:   Diagnosis Date    A-fib ([address]     Chronic back pain       Past Surgical History:   Procedure Laterality Date    HX AORTIC VALVE REPLACEMENT  2021    HX CAROTID STENT  2021    HX CATARACT REMOVAL Bilateral 2018      Social History     Tobacco Use    Smoking status: Former Smoker     Quit date:      Years since quittin.1    Smokeless tobacco: Never Used   Substance Use Topics    Alcohol use: No      Family History   Problem Relation Age of Onset    Heart Disease Mother     Thyroid Cancer Daughter      Current Outpatient Medications   Medication Sig    hydroCHLOROthiazide (HYDRODIURIL) 25 mg tablet     denosumab (PROLIA) 60 mg/mL injection subq injection to be administered in office every 6 months    hydrALAZINE (APRESOLINE) 50 mg tablet Take 25 mg by mouth three (3) times daily.  furosemide (LASIX) 20 mg tablet Take  by mouth daily.  predniSONE (STERAPRED DS) 10 mg dose pack 6 Day DS taper pack as directed    lidocaine (LIDODERM) 5 % Apply patch to the affected area for 12 hours a day and remove for 12 hours a day.  apixaban (Eliquis) 5 mg tablet TAKE 1 TABLET BY MOUTH TWICE DAILY    atorvastatin (LIPITOR) 40 mg tablet 40 mg.    clopidogreL (PLAVIX) 75 mg tab TAKE 1 TABLET BY MOUTH EVERY DAY    losartan (COZAAR) 100 mg tablet TAKE 1 TABLET BY MOUTH EVERY DAY    traMADoL (ULTRAM) 50 mg tablet TAKE 1 TABLET BY MOUTH EVERY 6 HOURS AS NEEDED FOR PAIN SCALE 6 TO 10    docusate sodium (COLACE) 100 mg capsule Take 100 mg by mouth two (2) times a day.  multivitamin (ONE A DAY) tablet Take 1 Tablet by mouth daily. No current facility-administered medications for this visit. No Known Allergies     Review of Systems Provided by:  Patient  Review of Systems: A complete review of systems was obtained, reviewed. Pertinent findings reviewed above. Objective:     Visit Vitals  BP (!) 163/57   Pulse 60   Resp 16   Ht 5' 7\" (1.702 m)   Wt 201 lb 9.6 oz (91.4 kg)   SpO2 97%   BMI 31.58 kg/m²     ECOG PS: 1- Restricted in physically strenuous activity but ambulatory and able to carry out work of a light or sedentary nature, e.g., light house work, office work. Physical Exam  Constitutional: No acute distress. , Non-toxic appearance. and Non-diaphoretic. HENT: Normocephalic and atraumatic head. and Wearing a mask during COVID-19 precautions. Eyes: Normal Conjunctivae. Anicteric sclerae. Cardiovascular: S1,S2 auscultated. No pitting edema but trace edema present. No friction rub. No gallops auscultated. Pulmonary: Normal Respiratory Effort. No wheezing. No rhonchi. No rales. Abdominal: Normal bowel sounds. Soft Abdomen to palpation.  No abdominal tenderness. Skin: No jaundice. No rash. No petechiae. Musculoskeletal: No muscle pain on palpation. No temporal muscle wasting on inspection. Lymph: Negative for bilateral cervical lymphadenopathy. Neurological: Alert and oriented. No tremor on inspection. Abnormal Gait. Using dolomite walker. Psychiatric: mood normal. normal speech rate normal affect    Results:     I personally reviewed Epic EHR labs/results below:   Lab Results   Component Value Date/Time    WBC 5.7 02/03/2022 02:41 PM    HGB 11.2 (L) 02/03/2022 02:41 PM    HCT 36.4 02/03/2022 02:41 PM    PLATELET 088 48/53/2100 02:41 PM    MCV 94.5 02/03/2022 02:41 PM    ABS. NEUTROPHILS 3.9 02/03/2022 02:41 PM     Lab Results   Component Value Date/Time    Sodium 136 02/03/2022 02:41 PM    Potassium 4.4 02/03/2022 02:41 PM    Chloride 103 02/03/2022 02:41 PM    CO2 30 02/03/2022 02:41 PM    Glucose 109 (H) 02/03/2022 02:41 PM    BUN 25 (H) 02/03/2022 02:41 PM    Creatinine 0.73 02/03/2022 02:41 PM    GFR est AA >60 02/03/2022 02:41 PM    GFR est non-AA >60 02/03/2022 02:41 PM    Calcium 9.2 02/03/2022 02:41 PM     Lab Results   Component Value Date/Time    Bilirubin, total 0.4 10/13/2021 04:13 PM    ALT (SGPT) 16 10/13/2021 04:13 PM    Alk.  phosphatase 108 10/13/2021 04:13 PM    Protein, total 7.3 02/03/2022 02:41 PM    Albumin 4.1 10/13/2021 04:13 PM    Globulin 5.1 (H) 09/03/2021 04:00 PM     Orders Only on 02/03/2022   Component Date Value Ref Range Status    WBC 02/03/2022 5.7  3.6 - 11.0 K/uL Final    RBC 02/03/2022 3.85  3.80 - 5.20 M/uL Final    HGB 02/03/2022 11.2* 11.5 - 16.0 g/dL Final    HCT 02/03/2022 36.4  35.0 - 47.0 % Final    MCV 02/03/2022 94.5  80.0 - 99.0 FL Final    MCH 02/03/2022 29.1  26.0 - 34.0 PG Final    MCHC 02/03/2022 30.8  30.0 - 36.5 g/dL Final    RDW 02/03/2022 16.1* 11.5 - 14.5 % Final    PLATELET 75/54/5541 423  150 - 400 K/uL Final    MPV 02/03/2022 9.6  8.9 - 12.9 FL Final    NRBC 02/03/2022 0.0 0  WBC Final    ABSOLUTE NRBC 02/03/2022 0.00  0.00 - 0.01 K/uL Final    NEUTROPHILS 02/03/2022 69  32 - 75 % Final    LYMPHOCYTES 02/03/2022 15  12 - 49 % Final    MONOCYTES 02/03/2022 12  5 - 13 % Final    EOSINOPHILS 02/03/2022 2  0 - 7 % Final    BASOPHILS 02/03/2022 1  0 - 1 % Final    IMMATURE GRANULOCYTES 02/03/2022 1* 0.0 - 0.5 % Final    ABS. NEUTROPHILS 02/03/2022 3.9  1.8 - 8.0 K/UL Final    ABS. LYMPHOCYTES 02/03/2022 0.8  0.8 - 3.5 K/UL Final    ABS. MONOCYTES 02/03/2022 0.7  0.0 - 1.0 K/UL Final    ABS. EOSINOPHILS 02/03/2022 0.1  0.0 - 0.4 K/UL Final    ABS. BASOPHILS 02/03/2022 0.1  0.0 - 0.1 K/UL Final    ABS. IMM. GRANS. 02/03/2022 0.1* 0.00 - 0.04 K/UL Final    DF 02/03/2022 AUTOMATED    Final    Sodium 02/03/2022 136  136 - 145 mmol/L Final    Potassium 02/03/2022 4.4  3.5 - 5.1 mmol/L Final    Chloride 02/03/2022 103  97 - 108 mmol/L Final    CO2 02/03/2022 30  21 - 32 mmol/L Final    Anion gap 02/03/2022 3* 5 - 15 mmol/L Final    Glucose 02/03/2022 109* 65 - 100 mg/dL Final    BUN 02/03/2022 25* 6 - 20 MG/DL Final    Creatinine 02/03/2022 0.73  0.55 - 1.02 MG/DL Final    BUN/Creatinine ratio 02/03/2022 34* 12 - 20   Final    GFR est AA 02/03/2022 >60  >60 ml/min/1.73m2 Final    GFR est non-AA 02/03/2022 >60  >60 ml/min/1.73m2 Final    Comment: Estimated GFR is calculated using the IDMS-traceable Modification of Diet in  Renal Disease (MDRD) Study equation, reported for both  Americans  (GFRAA) and non- Americans (GFRNA), and normalized to 1.73m2 body  surface area. The physician must decide which value applies to the patient.       Calcium 02/03/2022 9.2  8.5 - 10.1 MG/DL Final    Immunoglobulin G, Qt. 02/03/2022 1,744* 586 - 1,602 mg/dL Final    Immunoglobulin A, Qt. 02/03/2022 444* 64 - 422 mg/dL Final    Immunoglobulin M, Qt. 02/03/2022 174  26 - 217 mg/dL Final    Protein, total 02/03/2022 7.3  6.0 - 8.5 g/dL Final    Albumin 02/03/2022 3.2 2.9 - 4.4 g/dL Final    Alpha-1-Globulin 2022 0.3  0.0 - 0.4 g/dL Final    Alpha-2-Globulin 2022 0.9  0.4 - 1.0 g/dL Final    Beta Globulin 2022 1.1  0.7 - 1.3 g/dL Final    Gamma Globulin 2022 1.8  0.4 - 1.8 g/dL Final    M-Sylvester 2022 Not Observed  Not Observed g/dL Final    Globulin, total 2022 4.1* 2.2 - 3.9 g/dL Final    A/G ratio 2022 0.8  0.7 - 1.7   Final    Immunofixation Result 2022 Comment*   Final    Comment: (NOTE)  Polyclonal increase detected in one or more immunoglobulins.  Free Kappa Lt Chains, serum 2022 48.4* 3.3 - 19.4 mg/L Final    Free Lambda Lt Chains, serum 2022 32.0* 5.7 - 26.3 mg/L Final    Kappa/Lambda ratio, serum 2022 1.51  0.26 - 1.65   Final    Comment: (NOTE)  Performed At: [address] [id number]  Mary Huber MD K     [address] [place] [address] 2022 (NOTE)   Final    Comment: [id number]           Assessment and Recommendations:     1. Elevated serum globulin level  -We discussed that her light chains are both elevated but normal ratio. She will complete 24-hour urine testing which she submitted today. 2. Normocytic anemia  -Discussed with patient that she has mild anemia of unknown origin. We will continue to follow but since it is of mild nature she will return to see us in about 5 months or sooner if needed. Her primary care provider can reach out to us if it seems that her hemoglobin is dropping especially below 10.      3. Elevated serum immunoglobulin free light chains  -Elevated light chains but normal ratio. No plans for bone marrow biopsy at this time and no clear monoclonal paraproteinemia detected. Follow-up and Dispositions    · Return in about 5 months (around 2022).          Neli Dupree MD  Hematology/Medical Oncology Provider  [facility]  P: [phone]      Signed By: Wallie Goodpasture, MD

## 2022-03-02 NOTE — PROGRESS NOTES
Chief Complaint   Patient presents with    Follow-up     Vamshi Sofia is a pleasant [de-identified]year old woman who presents as a follow up.  She denies pain

## 2022-03-18 PROBLEM — Z95.2 H/O AORTIC VALVE REPLACEMENT: Status: ACTIVE | Noted: 2018-01-16

## 2022-03-18 PROBLEM — E78.00 ELEVATED CHOLESTEROL: Status: ACTIVE | Noted: 2017-10-11

## 2022-03-18 PROBLEM — I10 ESSENTIAL HYPERTENSION: Status: ACTIVE | Noted: 2017-10-11

## 2022-03-18 PROBLEM — I48.20 CHRONIC ATRIAL FIBRILLATION (HCC): Status: ACTIVE | Noted: 2017-01-26

## 2022-03-19 PROBLEM — R76.8 ELEVATED SERUM IMMUNOGLOBULIN FREE LIGHT CHAINS: Status: ACTIVE | Noted: 2022-03-02

## 2022-03-19 PROBLEM — I77.9 CAROTID ARTERIAL DISEASE (HCC): Status: ACTIVE | Noted: 2021-06-22

## 2022-03-19 PROBLEM — M81.0 AGE-RELATED OSTEOPOROSIS WITHOUT CURRENT PATHOLOGICAL FRACTURE: Status: ACTIVE | Noted: 2019-04-24

## 2022-03-19 PROBLEM — G89.29 CHRONIC BILATERAL LOW BACK PAIN WITHOUT SCIATICA: Status: ACTIVE | Noted: 2021-09-12

## 2022-03-19 PROBLEM — R77.1 ELEVATED SERUM GLOBULIN LEVEL: Status: ACTIVE | Noted: 2021-09-03

## 2022-03-19 PROBLEM — M54.50 CHRONIC BILATERAL LOW BACK PAIN WITHOUT SCIATICA: Status: ACTIVE | Noted: 2021-09-12

## 2022-03-20 PROBLEM — R09.02 HYPOXIA: Status: ACTIVE | Noted: 2021-06-22

## 2022-03-20 PROBLEM — D64.9 NORMOCYTIC ANEMIA: Status: ACTIVE | Noted: 2022-03-02

## 2022-03-20 PROBLEM — I49.5 SSS (SICK SINUS SYNDROME) (HCC): Status: ACTIVE | Noted: 2021-10-12

## 2022-07-07 DIAGNOSIS — I48.20 CHRONIC ATRIAL FIBRILLATION (HCC): ICD-10-CM

## 2022-08-04 DIAGNOSIS — M81.0 AGE-RELATED OSTEOPOROSIS WITHOUT CURRENT PATHOLOGICAL FRACTURE: ICD-10-CM

## 2022-08-11 ENCOUNTER — OFFICE VISIT (OUTPATIENT)
Dept: FAMILY MEDICINE CLINIC | Age: 82
End: 2022-08-11

## 2022-08-11 DIAGNOSIS — M81.0 AGE-RELATED OSTEOPOROSIS WITHOUT CURRENT PATHOLOGICAL FRACTURE: Primary | ICD-10-CM

## 2022-08-11 NOTE — PROGRESS NOTES
Chief Complaint   Patient presents with    Injection     Prolia      Pt being seen for prolia     Chief Complaint   Patient presents with    Injection     Prolia       There were no vitals taken for this visit. After obtaining Rama Pike's consent, and per orders of dottie, injection of prolia given by Nicole Kelley in (L) arm. Patient instructed to remain in clinic for 20 minutes afterwards, and to report any adverse reaction to me immediately. Patient did not display any adverse side effects. Patient was advsied to return in 6 month(s).      Ndc: [id number]  Lot: [id number]  Exp: 07/24

## 2022-08-29 DIAGNOSIS — I48.20 CHRONIC ATRIAL FIBRILLATION (HCC): ICD-10-CM

## 2022-09-08 ENCOUNTER — TELEPHONE (OUTPATIENT)
Dept: FAMILY MEDICINE CLINIC | Age: 82
End: 2022-09-08

## 2022-09-08 NOTE — TELEPHONE ENCOUNTER
Yulia with [facility] called in stated they received an order from [facility] for OT, PT & Skilled Nursing. They are wondering if you would follow pt. Call back number for them is [phone]. Thanks.

## 2022-09-12 NOTE — PATIENT INSTRUCTIONS
You should check your blood pressure at home. Check first thing in the morning. You should be relaxed, seated with back supported, feet flat on the floor, arm with cuff resting at heart height. You should check your blood pressure 1-3 times. Please write down your blood pressures and bring this record and your blood pressure cuff/machine to your follow-up visit. I would like for your blood pressure to be less than 140 for the top number and above 60 for the bottom number. Please follow-up sooner for consistently high blood pressure readings at home. Adult

## 2022-10-25 DIAGNOSIS — I48.20 CHRONIC ATRIAL FIBRILLATION (HCC): ICD-10-CM

## 2023-01-18 DIAGNOSIS — I48.20 CHRONIC ATRIAL FIBRILLATION (HCC): ICD-10-CM

## 2023-02-07 ENCOUNTER — TELEPHONE (OUTPATIENT)
Dept: FAMILY MEDICINE CLINIC | Age: 83
End: 2023-02-07

## 2023-02-07 NOTE — TELEPHONE ENCOUNTER
----- Message from Luna Ahuja sent at 2/7/2023  1:05 PM EST -----  Subject: Referral Request    Reason for referral request? cholesterol check   Provider patient wants to be referred to(if known):     Provider Phone Number(if known):     Additional Information for Provider?   ---------------------------------------------------------------------------  --------------  [id number]    [phone]; OK to leave message on voicemail  ---------------------------------------------------------------------------  --------------

## 2023-02-13 ENCOUNTER — OFFICE VISIT (OUTPATIENT)
Dept: FAMILY MEDICINE CLINIC | Age: 83
End: 2023-02-13

## 2023-02-13 DIAGNOSIS — M81.0 AGE-RELATED OSTEOPOROSIS WITHOUT CURRENT PATHOLOGICAL FRACTURE: Primary | ICD-10-CM

## 2023-02-13 NOTE — PROGRESS NOTES
Chief Complaint   Patient presents with    Injection     Prolia           After obtaining Rama Pike's consent, and per orders of dottie, injection of prolia given by [employer] in (L) arm. Patient instructed to remain in clinic for 20 minutes afterwards, and to report any adverse reaction to me immediately. Patient did not display any adverse side effects. Patient was advsied to return in 6 month(s).      Ndc: 31195-326-21  Lot: [id number]  exp: 04/30/2025      Pt has no other concerns

## 2023-03-17 ENCOUNTER — OFFICE VISIT (OUTPATIENT)
Dept: FAMILY MEDICINE CLINIC | Age: 83
End: 2023-03-17

## 2023-03-17 VITALS
TEMPERATURE: 98.8 F | RESPIRATION RATE: 20 BRPM | HEART RATE: 64 BPM | BODY MASS INDEX: 31.39 KG/M2 | HEIGHT: 67 IN | WEIGHT: 200 LBS | OXYGEN SATURATION: 93 % | SYSTOLIC BLOOD PRESSURE: 204 MMHG | DIASTOLIC BLOOD PRESSURE: 63 MMHG

## 2023-03-17 DIAGNOSIS — G89.29 CHRONIC BILATERAL LOW BACK PAIN WITH LEFT-SIDED SCIATICA: Primary | ICD-10-CM

## 2023-03-17 DIAGNOSIS — I48.20 CHRONIC ATRIAL FIBRILLATION (HCC): ICD-10-CM

## 2023-03-17 DIAGNOSIS — I10 ESSENTIAL HYPERTENSION: ICD-10-CM

## 2023-03-17 DIAGNOSIS — E78.00 ELEVATED CHOLESTEROL: ICD-10-CM

## 2023-03-17 DIAGNOSIS — Z00.00 MEDICARE ANNUAL WELLNESS VISIT, SUBSEQUENT: ICD-10-CM

## 2023-03-17 DIAGNOSIS — M54.42 CHRONIC BILATERAL LOW BACK PAIN WITH LEFT-SIDED SCIATICA: Primary | ICD-10-CM

## 2023-03-17 DIAGNOSIS — I65.23 BILATERAL CAROTID ARTERY STENOSIS: ICD-10-CM

## 2023-03-17 PROBLEM — I77.9 CAROTID ARTERIAL DISEASE (HCC): Status: RESOLVED | Noted: 2021-06-22 | Resolved: 2023-03-17

## 2023-03-17 RX ORDER — TRAMADOL HYDROCHLORIDE 50 MG/1
50 TABLET ORAL
Qty: 15 TABLET | Refills: 0 | Status: SHIPPED | OUTPATIENT
Start: 2023-03-17 | End: 2023-03-22

## 2023-03-17 RX ORDER — ATORVASTATIN CALCIUM 80 MG/1
80 TABLET, FILM COATED ORAL DAILY
COMMUNITY
Start: 2023-03-07

## 2023-03-17 RX ORDER — CLONIDINE HYDROCHLORIDE 0.2 MG/1
0.2 TABLET ORAL 2 TIMES DAILY
COMMUNITY
Start: 2023-02-23

## 2023-03-17 NOTE — PROGRESS NOTES
Chief Complaint   Patient presents with    Hypertension    Cholesterol Problem    Labs    Back Pain         1. Patient in office today for follow up and fasting labs. 2.Pt would like a refill of tramadol for prn of lower back pain. Pt states pain has worsened in the past yr. Pain is noted with movement.,no pain at rest.      Denies numbness or tingling in LE. Denies recent injury that would exacerbate the pain. Have been treating with tylenol with no improvement noted. 1. Have you been to the ER, urgent care clinic since your last visit? Hospitalized since your last visit? No    2. Have you seen or consulted any other health care providers outside of the [address] [place] [address] since your last visit? Include any pap smears or colon screening.  No

## 2023-03-17 NOTE — PATIENT INSTRUCTIONS
Medicare Wellness Visit, Female     The best way to live healthy is to have a lifestyle where you eat a well-balanced diet, exercise regularly, limit alcohol use, and quit all forms of tobacco/nicotine, if applicable. Regular preventive services are another way to keep healthy. Preventive services (vaccines, screening tests, monitoring & exams) can help personalize your care plan, which helps you manage your own care. Screening tests can find health problems at the earliest stages, when they are easiest to treat. [facility] follows the current, evidence-based guidelines published by the [facility] Keshawn Hsu ([facility]) when recommending preventive services for our patients. Because we follow these guidelines, sometimes recommendations change over time as research supports it. (For example, mammograms used to be recommended annually. Even though Medicare will still pay for an annual mammogram, the newer guidelines recommend a mammogram every two years for women of average risk). Of course, you and your doctor may decide to screen more often for some diseases, based on your risk and your co-morbidities (chronic disease you are already diagnosed with). Preventive services for you include:  - Medicare offers their members a free annual wellness visit, which is time for you and your primary care provider to discuss and plan for your preventive service needs.  Take advantage of this benefit every year!    -Over the age of 72 should receive the recommended pneumonia vaccines.    -All adults should have a flu vaccine yearly.  -All adults should have a tetanus vaccine every 10 years.   -Over the age 48 should receive the shingles vaccines.        -All adults should be screened once for Hepatitis C.  -All adults age 38-68 who are overweight should have a diabetes screening test once every three years.   -Other screening tests and preventive services for persons with diabetes include: an eye exam to screen for diabetic retinopathy, a kidney function test, a foot exam, and stricter control over your cholesterol.   -Cardiovascular screening for adults with routine risk involves an electrocardiogram (ECG) at intervals determined by your doctor.     -Colorectal cancer screenings should be done for adults age 39-70 with no increased risk factors for colorectal cancer. There are a number of acceptable methods of screening for this type of cancer. Each test has its own benefits and drawbacks. Discuss with your doctor what is most appropriate for you during your annual wellness visit. The different tests include: colonoscopy (considered the best screening method), a fecal occult blood test, a fecal DNA test, and sigmoidoscopy.    -Lung cancer screening is recommended annually with a low dose CT scan for adults between age 54 and 68, who have smoked at least 30 pack years (equivalent of 1 pack per day for 30 days), and who is a current smoker or quit less than 15 years ago.    -A bone mass density test is recommended when a woman turns 65 to screen for osteoporosis. This test is only recommended one time, as a screening. Some providers will use this same test as a disease monitoring tool if you already have osteoporosis. -Breast cancer screenings are recommended every other year for women of normal risk, age 54-69.    -Cervical cancer screenings for women over age 72 are only recommended with certain risk factors.      Here is a list of your current Health Maintenance items (your personalized list of preventive services) with a due date:  Health Maintenance Due   Topic Date Due    Shingles Vaccine (1 of 2) Never done    COVID-19 Vaccine (2 - Pfizer series) 05/21/2021    Cholesterol Test   06/22/2022    Yearly Flu Vaccine (1) 08/01/2022    Depresssion Screening  03/02/2023

## 2023-03-17 NOTE — PROGRESS NOTES
Progress Note    she is a 81y.o. year old female who presents for evalution. Subjective:     Pt with hx of low back pain and states has been worsening over the years. .  States when she had neck surgery she received some tramadol and they worked quite well. Pain is only when she moves. She is on eliquis for chronic afib. No imaging on file for her back. States pred works for her back but makes her BP go thru the roof. BP very high, having a hard time with her breathing and she did not take clonidine today yet. Cardiology is managing the BP. Carotid disease followed with vascular endarterectomy on L. Watching R side now. On 80 of lipitor tolerating but wants to see if she cn just be on 40 but has no side effects. Reviewed PmHx, RxHx, FmHx, SocHx, AllgHx and updated and dated in the chart. Review of Systems - negative except as listed above in the HPI    Objective:     Vitals:    03/17/23 1102   BP: (!) 207/63   Pulse: 64   Resp: 20   Temp: 98.8 °F (37.1 °C)   TempSrc: Oral   SpO2: 93%   Weight: 200 lb (90.7 kg)   Height: 5' 7\" (1.702 m)       Current Outpatient Medications   Medication Sig    cloNIDine HCL (CATAPRES) 0.2 mg tablet Take 0.2 mg by mouth two (2) times a day. atorvastatin (LIPITOR) 80 mg tablet Take 80 mg by mouth daily. traMADoL (ULTRAM) 50 mg tablet Take 1 Tablet by mouth every six (6) hours as needed for Pain for up to 5 days. Max Daily Amount: 200 mg.    apixaban (Eliquis) 5 mg tablet TAKE 1 TABLET BY MOUTH TWICE DAILY    denosumab (PROLIA) 60 mg/mL injection subq injection to be administered in office every 6 months    hydroCHLOROthiazide (HYDRODIURIL) 25 mg tablet     hydrALAZINE (APRESOLINE) 50 mg tablet Take 25 mg by mouth three (3) times daily. furosemide (LASIX) 20 mg tablet Take  by mouth daily.     clopidogreL (PLAVIX) 75 mg tab TAKE 1 TABLET BY MOUTH EVERY DAY    losartan (COZAAR) 100 mg tablet TAKE 1 TABLET BY MOUTH EVERY DAY    docusate sodium (COLACE) 100 mg capsule Take 100 mg by mouth two (2) times a day. multivitamin (ONE A DAY) tablet Take 1 Tablet by mouth daily. predniSONE (STERAPRED DS) 10 mg dose pack 6 Day DS taper pack as directed (Patient not taking: Reported on 3/17/2023)    lidocaine (LIDODERM) 5 % Apply patch to the affected area for 12 hours a day and remove for 12 hours a day. (Patient not taking: Reported on 3/17/2023)     No current facility-administered medications for this visit. Physical Examination: General appearance - chronically ill appearing  Back exam - limited range of motion, pain with motion noted during exam, nodeformity      Assessment/ Plan:   Diagnoses and all orders for this visit:    1. Chronic bilateral low back pain with left-sided sciatica  -     XR SPINE LUMB 2 OR 3 V; Future  -     traMADoL (ULTRAM) 50 mg tablet; Take 1 Tablet by mouth every six (6) hours as needed for Pain for up to 5 days. Max Daily Amount: 200 mg.    2. Bilateral carotid artery stenosis  Followed by vascular  3. Chronic atrial fibrillation (HCC)  -     apixaban (Eliquis) 5 mg tablet; TAKE 1 TABLET BY MOUTH TWICE DAILY    4. Medicare annual wellness visit, subsequent     5. Mixed Hyperlipidemia  CMp  Lipid  LDL goal less than 70    6. Essential Hypertension  Go home and take the clonidine  Follow-up and Dispositions    Return in about 6 months (around 9/17/2023), or if symptoms worsen or fail to improve. I have discussed the diagnosis with the patient and the intended plan as seen in the above orders. The patient has received an after-visit summary and questions were answered concerning future plans. Pt conveyed understanding of plan.     Medication Side Effects and Warnings were discussed with patient    An electronic signature was used to authenticate this note  Marylene Claw, DO      This is the Subsequent Medicare Annual Wellness Exam, performed 12 months or more after the Initial AWV or the last Subsequent AWV    I have reviewed the patient's medical history in detail and updated the computerized patient record. Assessment/Plan   Education and counseling provided:  Are appropriate based on today's review and evaluation    1. Chronic bilateral low back pain with left-sided sciatica  -     XR SPINE LUMB 2 OR 3 V; Future  -     traMADoL (ULTRAM) 50 mg tablet; Take 1 Tablet by mouth every six (6) hours as needed for Pain for up to 5 days. Max Daily Amount: 200 mg., Normal, Disp-15 Tablet, R-0  2. Bilateral carotid artery stenosis  3. Chronic atrial fibrillation (HCC)  -     apixaban (Eliquis) 5 mg tablet; TAKE 1 TABLET BY MOUTH TWICE DAILY, Normal, Disp-180 Tablet, R-3  4. Medicare annual wellness visit, subsequent  5. Elevated cholesterol  -     LIPID PANEL; Future  -     METABOLIC PANEL, COMPREHENSIVE; Future       Depression Risk Factor Screening     3 most recent PHQ Screens 3/17/2023   Little interest or pleasure in doing things Not at all   Feeling down, depressed, irritable, or hopeless Not at all   Total Score PHQ 2 0       Alcohol & Drug Abuse Risk Screen    Do you average more than 1 drink per night or more than 7 drinks a week:  No    On any one occasion in the past three months have you have had more than 3 drinks containing alcohol:  No       Opioid Risk: (Low risk score <55, High risk score ?55)  Opioid risk score: 13      Click here to complete the Controlled Substance Monitoring SmartForm    Last PDMP Kareem as Reviewed:  Review User Review Instant Review Result   CARLOS A MORA 3/17/2023 11:26 AM Reviewed PDMP [1]         Functional Ability and Level of Safety    Hearing: Hearing is good. Activities of Daily Living: The home contains: grab bars  Patient needs help with:  transportation, shopping, and housework      Ambulation: with difficulty, uses a rollator     Fall Risk:  Fall Risk Assessment, last 12 mths 3/17/2023   Able to walk? Yes   Fall in past 12 months? 0   Do you feel unsteady?  0   Are you worried about falling 0   Is the gait abnormal? -   Number of falls in past 12 months -      Abuse Screen:  Patient is not abused       Cognitive Screening    Has your family/caregiver stated any concerns about your memory: no         Health Maintenance Due     Health Maintenance Due   Topic Date Due    Shingles Vaccine (1 of 2) Never done    COVID-19 Vaccine (2 - Pfizer series) 05/21/2021    Lipid Screen  06/22/2022    Flu Vaccine (1) 08/01/2022    Depression Screen  03/02/2023       Patient Care Team   Patient Care Team:  Nabeel De La Vega DO as PCP - General (Family Medicine)  Nabeel De La Vega DO as PCP - [facility]    History     Patient Active Problem List   Diagnosis Code    Chronic atrial fibrillation ([facility] [address] I48.20    Elevated cholesterol E78.00    Essential hypertension I10    H/O aortic valve replacement Z95.2    Age-related osteoporosis without current pathological fracture M81.0    Hypoxia R09.02    Elevated serum globulin level R77.1    Chronic bilateral low back pain without sciatica M54.50, G89.29    SSS (sick sinus syndrome) (HCC) I49.5    Normocytic anemia D64.9    Elevated serum immunoglobulin free light chains R76.8     Past Medical History:   Diagnosis Date    A-fib ([facility] [address]     Chronic back pain       Past Surgical History:   Procedure Laterality Date    HX AORTIC VALVE REPLACEMENT  04/13/2021    HX CAROTID STENT  06/17/2021    HX CATARACT REMOVAL Bilateral 08/07/2018     Current Outpatient Medications   Medication Sig Dispense Refill    cloNIDine HCL (CATAPRES) 0.2 mg tablet Take 0.2 mg by mouth two (2) times a day. atorvastatin (LIPITOR) 80 mg tablet Take 80 mg by mouth daily. traMADoL (ULTRAM) 50 mg tablet Take 1 Tablet by mouth every six (6) hours as needed for Pain for up to 5 days.  Max Daily Amount: 200 mg. 15 Tablet 0    apixaban (Eliquis) 5 mg tablet TAKE 1 TABLET BY MOUTH TWICE DAILY 180 Tablet 3    denosumab (PROLIA) 60 mg/mL injection subq injection to be administered in office every 6 months 1 mL 1    hydroCHLOROthiazide (HYDRODIURIL) 25 mg tablet       hydrALAZINE (APRESOLINE) 50 mg tablet Take 25 mg by mouth three (3) times daily. furosemide (LASIX) 20 mg tablet Take  by mouth daily. clopidogreL (PLAVIX) 75 mg tab TAKE 1 TABLET BY MOUTH EVERY DAY      losartan (COZAAR) 100 mg tablet TAKE 1 TABLET BY MOUTH EVERY DAY      docusate sodium (COLACE) 100 mg capsule Take 100 mg by mouth two (2) times a day. multivitamin (ONE A DAY) tablet Take 1 Tablet by mouth daily. predniSONE (STERAPRED DS) 10 mg dose pack 6 Day DS taper pack as directed (Patient not taking: Reported on 3/17/2023) 1 Dose Pack 0    lidocaine (LIDODERM) 5 % Apply patch to the affected area for 12 hours a day and remove for 12 hours a day.  (Patient not taking: Reported on 3/17/2023) 30 Each 2     No Known Allergies    Family History   Problem Relation Age of Onset    Heart Disease Mother     Thyroid Cancer Daughter      Social History     Tobacco Use    Smoking status: Former     Types: Cigarettes     Quit date:      Years since quittin.2    Smokeless tobacco: Never   Substance Use Topics    Alcohol use: No         Vilma Bonds,

## 2023-03-18 LAB
ALBUMIN SERPL-MCNC: 3.4 G/DL (ref 3.5–5)
ALBUMIN/GLOB SERPL: 0.8 (ref 1.1–2.2)
ALP SERPL-CCNC: 141 U/L (ref 45–117)
ALT SERPL-CCNC: 42 U/L (ref 12–78)
ANION GAP SERPL CALC-SCNC: 8 MMOL/L (ref 5–15)
AST SERPL-CCNC: 46 U/L (ref 15–37)
BILIRUB SERPL-MCNC: 0.6 MG/DL (ref 0.2–1)
BUN SERPL-MCNC: 22 MG/DL (ref 6–20)
BUN/CREAT SERPL: 29 (ref 12–20)
CALCIUM SERPL-MCNC: 8.9 MG/DL (ref 8.5–10.1)
CHLORIDE SERPL-SCNC: 103 MMOL/L (ref 97–108)
CHOLEST SERPL-MCNC: 97 MG/DL
CO2 SERPL-SCNC: 27 MMOL/L (ref 21–32)
CREAT SERPL-MCNC: 0.77 MG/DL (ref 0.55–1.02)
GLOBULIN SER CALC-MCNC: 4.3 G/DL (ref 2–4)
GLUCOSE SERPL-MCNC: 116 MG/DL (ref 65–100)
HDLC SERPL-MCNC: 50 MG/DL
HDLC SERPL: 1.9 (ref 0–5)
LDLC SERPL CALC-MCNC: 31.6 MG/DL (ref 0–100)
POTASSIUM SERPL-SCNC: 4 MMOL/L (ref 3.5–5.1)
PROT SERPL-MCNC: 7.7 G/DL (ref 6.4–8.2)
SODIUM SERPL-SCNC: 138 MMOL/L (ref 136–145)
TRIGL SERPL-MCNC: 77 MG/DL (ref ?–150)
VLDLC SERPL CALC-MCNC: 15.4 MG/DL

## 2023-03-23 ENCOUNTER — TELEPHONE (OUTPATIENT)
Dept: FAMILY MEDICINE CLINIC | Age: 83
End: 2023-03-23

## 2023-03-23 NOTE — TELEPHONE ENCOUNTER
Pt returned your call in regards to her lab results. Please call back when you get a chance. Thanks.

## 2023-04-18 ENCOUNTER — OFFICE VISIT (OUTPATIENT)
Dept: FAMILY MEDICINE CLINIC | Age: 83
End: 2023-04-18
Payer: MEDICARE

## 2023-04-18 VITALS
OXYGEN SATURATION: 96 % | SYSTOLIC BLOOD PRESSURE: 183 MMHG | BODY MASS INDEX: 30.78 KG/M2 | WEIGHT: 196.1 LBS | DIASTOLIC BLOOD PRESSURE: 66 MMHG | HEIGHT: 67 IN | HEART RATE: 66 BPM | TEMPERATURE: 97.5 F

## 2023-04-18 DIAGNOSIS — R79.89 ELEVATED LFTS: Primary | ICD-10-CM

## 2023-04-18 DIAGNOSIS — J44.9 CHRONIC OBSTRUCTIVE PULMONARY DISEASE, UNSPECIFIED COPD TYPE (HCC): ICD-10-CM

## 2023-04-18 DIAGNOSIS — I10 ESSENTIAL HYPERTENSION: ICD-10-CM

## 2023-04-18 LAB
ALBUMIN SERPL-MCNC: 3.3 G/DL (ref 3.5–5)
ALBUMIN/GLOB SERPL: 0.8 (ref 1.1–2.2)
ALP SERPL-CCNC: 121 U/L (ref 45–117)
ALT SERPL-CCNC: 42 U/L (ref 12–78)
AST SERPL-CCNC: 51 U/L (ref 15–37)
BILIRUB DIRECT SERPL-MCNC: 0.2 MG/DL (ref 0–0.2)
BILIRUB SERPL-MCNC: 0.6 MG/DL (ref 0.2–1)
GLOBULIN SER CALC-MCNC: 4.3 G/DL (ref 2–4)
PROT SERPL-MCNC: 7.6 G/DL (ref 6.4–8.2)

## 2023-04-18 PROCEDURE — G8510 SCR DEP NEG, NO PLAN REQD: HCPCS | Performed by: STUDENT IN AN ORGANIZED HEALTH CARE EDUCATION/TRAINING PROGRAM

## 2023-04-18 PROCEDURE — 1123F ACP DISCUSS/DSCN MKR DOCD: CPT | Performed by: STUDENT IN AN ORGANIZED HEALTH CARE EDUCATION/TRAINING PROGRAM

## 2023-04-18 PROCEDURE — 1101F PT FALLS ASSESS-DOCD LE1/YR: CPT | Performed by: STUDENT IN AN ORGANIZED HEALTH CARE EDUCATION/TRAINING PROGRAM

## 2023-04-18 PROCEDURE — G8536 NO DOC ELDER MAL SCRN: HCPCS | Performed by: STUDENT IN AN ORGANIZED HEALTH CARE EDUCATION/TRAINING PROGRAM

## 2023-04-18 PROCEDURE — 3077F SYST BP >= 140 MM HG: CPT | Performed by: STUDENT IN AN ORGANIZED HEALTH CARE EDUCATION/TRAINING PROGRAM

## 2023-04-18 PROCEDURE — G8427 DOCREV CUR MEDS BY ELIG CLIN: HCPCS | Performed by: STUDENT IN AN ORGANIZED HEALTH CARE EDUCATION/TRAINING PROGRAM

## 2023-04-18 PROCEDURE — G8417 CALC BMI ABV UP PARAM F/U: HCPCS | Performed by: STUDENT IN AN ORGANIZED HEALTH CARE EDUCATION/TRAINING PROGRAM

## 2023-04-18 PROCEDURE — 1090F PRES/ABSN URINE INCON ASSESS: CPT | Performed by: STUDENT IN AN ORGANIZED HEALTH CARE EDUCATION/TRAINING PROGRAM

## 2023-04-18 PROCEDURE — 3078F DIAST BP <80 MM HG: CPT | Performed by: STUDENT IN AN ORGANIZED HEALTH CARE EDUCATION/TRAINING PROGRAM

## 2023-04-18 PROCEDURE — 99214 OFFICE O/P EST MOD 30 MIN: CPT | Performed by: STUDENT IN AN ORGANIZED HEALTH CARE EDUCATION/TRAINING PROGRAM

## 2023-04-18 RX ORDER — HYDRALAZINE HYDROCHLORIDE 100 MG/1
100 TABLET, FILM COATED ORAL 3 TIMES DAILY
Qty: 90 TABLET | Refills: 0
Start: 2023-04-18

## 2023-04-18 NOTE — PROGRESS NOTES
Keshawn Branham is a 81 y.o. female , id x 2(name and ). Reviewed record, history, and  medications. Chief Complaint   Patient presents with    Labs     1 month f/up on LFT's - [facility] pt        Vitals:    23 0913   BP: (!) 183/66   Pulse: 66   Temp: 97.5 °F (36.4 °C)   SpO2: 96%   Weight: 196 lb 1.6 oz (89 kg)   Height: 5' 7\" (1.702 m)       Coordination of Care Questionnaire:   1. Have you been to the ER, urgent care clinic since your last visit? Hospitalized since your last visit? No    2. Have you seen or consulted any other health care providers outside of the [address] [place] [address] since your last visit? Include any pap smears or colon screening. No      3 most recent PHQ Screens 2023   Little interest or pleasure in doing things Not at all   Feeling down, depressed, irritable, or hopeless Not at all   Total Score PHQ 2 0       Social Determinants of Health     Tobacco Use: Medium Risk    Smoking Tobacco Use: Former    Smokeless Tobacco Use: Never    Passive Exposure: Not on file   Alcohol Use: Not on file   Financial Resource Strain: Not on file   Food Insecurity: Not on file   Transportation Needs: Not on file   Physical Activity: Not on file   Stress: Not on file   Social Connections: Not on file   Intimate Partner Violence: Not on file   Depression: Not at risk    PHQ-2 Score: 0   Housing Stability: Not on file       Patient is accompanied by self I have received verbal consent from Keshawn Branham to discuss any/all medical information while they are present in the room.

## 2023-04-18 NOTE — ASSESSMENT & PLAN NOTE
Outpatient blood pressure typically elevated since she holds clonidine due to fatigue before her appointments  Managed by Dr. Lorena Borrero, cardiologist  Medications reviewed in detail and dose updated per current home medication  Provided with handout on hypertensive emergency for reference

## 2023-04-18 NOTE — ASSESSMENT & PLAN NOTE
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

## 2023-04-18 NOTE — PROGRESS NOTES
Progress Note    she is a 81y.o. year old female who presents for evalution. Assessment/ Plan:   Diagnoses and all orders for this visit:    1. Elevated LFTs  Assessment & Plan:  Reassess with labs today  If persistently elevated, will obtain additional labs for hepatitis, GGT, PT and PTT, and a liver ultrasound  Consider medication induced liver injury, NAFLD, less likely alpha 1 antitrypsin (severe COPD)    Orders:  -     HEPATIC FUNCTION PANEL; Future    2. Essential hypertension  Assessment & Plan:  Outpatient blood pressure typically elevated since she holds clonidine due to fatigue before her appointments  Managed by Dr. Erik Cunningham, cardiologist  Medications reviewed in detail and dose updated per current home medication  Provided with handout on hypertensive emergency for reference    Orders:  -     hydrALAZINE (APRESOLINE) 100 mg tablet; Take 1 Tablet by mouth three (3) times daily. 3. 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 Follow-up and Dispositions    Return if symptoms worsen or fail to improve. I have discussed the diagnosis with the patient and the intended plan as seen in the above orders. The patient has received an after-visit summary and questions were answered concerning future plans. Pt conveyed understanding of plan.     Medication Side Effects and Warnings were discussed with patient        Subjective:     Chief Complaint   Patient presents with    Labs     1 month f/up on LFT's - [facility] pt      Not yet taken lasix or clonidine today  Clonidine makes her sleepy  Home bp monitorin-025 systolic, higher in the afternoon   Yesterday 673 systolic and took an extra clonidine dose  BP managed by Dr. Erik Cunningham (cardiologist)    No abd pain  No jaundice  No alcohol in 6 years  Occasional tylenol use    Breathing is worse   Upcoming appt with the lung doctor. One inhaler didn't work, the other made it worse  COPD, smoked for 60 years. Reviewed PmHx, RxHx, FmHx, SocHx, AllgHx and updated and dated in the chart. Review of Systems - negative except as listed above in the HPI    Objective:     Vitals:    04/18/23 0913   BP: (!) 183/66   Pulse: 66   Temp: 97.5 °F (36.4 °C)   SpO2: 96%   Weight: 196 lb 1.6 oz (89 kg)   Height: 5' 7\" (1.702 m)       Current Outpatient Medications   Medication Sig    hydrALAZINE (APRESOLINE) 100 mg tablet Take 1 Tablet by mouth three (3) times daily. cloNIDine HCL (CATAPRES) 0.2 mg tablet Take 1 Tablet by mouth two (2) times a day. atorvastatin (LIPITOR) 80 mg tablet Take 1 Tablet by mouth daily. apixaban (Eliquis) 5 mg tablet TAKE 1 TABLET BY MOUTH TWICE DAILY    denosumab (PROLIA) 60 mg/mL injection subq injection to be administered in office every 6 months    hydroCHLOROthiazide (HYDRODIURIL) 25 mg tablet     furosemide (LASIX) 20 mg tablet Take  by mouth daily. clopidogreL (PLAVIX) 75 mg tab TAKE 1 TABLET BY MOUTH EVERY DAY    losartan (COZAAR) 100 mg tablet TAKE 1 TABLET BY MOUTH EVERY DAY    docusate sodium (COLACE) 100 mg capsule Take 1 Capsule by mouth two (2) times a day. multivitamin (ONE A DAY) tablet Take 1 Tablet by mouth daily. No current facility-administered medications for this visit. Physical Exam  Vitals and nursing note reviewed. Constitutional:       General: She is not in acute distress. Appearance: Normal appearance. She is obese. She is not ill-appearing, toxic-appearing or diaphoretic. HENT:      Head: Normocephalic and atraumatic. Eyes:      General: No scleral icterus. Right eye: No discharge. Left eye: No discharge. Conjunctiva/sclera: Conjunctivae normal.   Cardiovascular:      Rate and Rhythm: Normal rate and regular rhythm. Pulses: Normal pulses.       Heart sounds: Normal heart sounds. Pulmonary:      Effort: Respiratory distress (mild while at 2 L and then off of supplemental O2, improved with resuming 4L) present. Breath sounds: Normal breath sounds. Decreased air movement present. Abdominal:      Palpations: Abdomen is soft. There is no mass. Tenderness: There is no abdominal tenderness. Musculoskeletal:         General: No tenderness. Cervical back: No rigidity. Right lower leg: No edema. Left lower leg: No edema. Skin:     General: Skin is warm and dry. Neurological:      General: No focal deficit present. Mental Status: She is alert. Psychiatric:         Mood and Affect: Mood normal.         Behavior: Behavior normal.         Thought Content:  Thought content normal.         Judgment: Judgment normal.            Heather Ferrari MD

## 2023-04-18 NOTE — ASSESSMENT & PLAN NOTE
Reassess with labs today  If persistently elevated, will obtain additional labs for hepatitis, GGT, PT and PTT, and a liver ultrasound  Consider medication induced liver injury, NAFLD, less likely alpha 1 antitrypsin (severe COPD)

## 2023-04-25 ENCOUNTER — TELEPHONE (OUTPATIENT)
Dept: FAMILY MEDICINE CLINIC | Age: 83
End: 2023-04-25

## 2023-04-25 DIAGNOSIS — R79.89 ELEVATED LFTS: Primary | ICD-10-CM

## 2023-04-25 DIAGNOSIS — Z11.59 ENCOUNTER FOR SCREENING FOR OTHER VIRAL DISEASES: ICD-10-CM

## 2023-04-29 LAB
BASOPHILS # BLD: 0 K/UL (ref 0–0.1)
BASOPHILS NFR BLD: 1 % (ref 0–1)
DIFFERENTIAL METHOD BLD: ABNORMAL
EOSINOPHIL # BLD: 0.2 K/UL (ref 0–0.4)
EOSINOPHIL NFR BLD: 3 % (ref 0–7)
ERYTHROCYTE [DISTWIDTH] IN BLOOD BY AUTOMATED COUNT: 16.5 % (ref 11.5–14.5)
FERRITIN SERPL-MCNC: 102 NG/ML (ref 8–252)
HBV SURFACE AB SER QL: NONREACTIVE
HBV SURFACE AB SER-ACNC: <3.1 MIU/ML
HBV SURFACE AG SER QL: <0.1 INDEX
HBV SURFACE AG SER QL: NEGATIVE
HCT VFR BLD AUTO: 38.2 % (ref 35–47)
HCV AB SERPL QL IA: NONREACTIVE
HGB BLD-MCNC: 11.1 G/DL (ref 11.5–16)
IMM GRANULOCYTES # BLD AUTO: 0.1 K/UL (ref 0–0.04)
IMM GRANULOCYTES NFR BLD AUTO: 1 % (ref 0–0.5)
IRON SATN MFR SERPL: 16 % (ref 20–50)
IRON SERPL-MCNC: 51 UG/DL (ref 35–150)
LYMPHOCYTES # BLD: 1.2 K/UL (ref 0.8–3.5)
LYMPHOCYTES NFR BLD: 18 % (ref 12–49)
MCH RBC QN AUTO: 27.8 PG (ref 26–34)
MCHC RBC AUTO-ENTMCNC: 29.1 G/DL (ref 30–36.5)
MCV RBC AUTO: 95.5 FL (ref 80–99)
MONOCYTES # BLD: 1 K/UL (ref 0–1)
MONOCYTES NFR BLD: 14 % (ref 5–13)
NEUTS SEG # BLD: 4.4 K/UL (ref 1.8–8)
NEUTS SEG NFR BLD: 63 % (ref 32–75)
NRBC # BLD: 0 K/UL (ref 0–0.01)
NRBC BLD-RTO: 0 PER 100 WBC
PLATELET # BLD AUTO: 240 K/UL (ref 150–400)
PMV BLD AUTO: 10.1 FL (ref 8.9–12.9)
RBC # BLD AUTO: 4 M/UL (ref 3.8–5.2)
TIBC SERPL-MCNC: 327 UG/DL (ref 250–450)
WBC # BLD AUTO: 6.9 K/UL (ref 3.6–11)

## 2023-05-04 ENCOUNTER — TRANSCRIBE ORDERS (OUTPATIENT)
Facility: HOSPITAL | Age: 83
End: 2023-05-04

## 2023-05-04 DIAGNOSIS — R79.89 ELEVATED LFTS: Primary | ICD-10-CM

## 2023-05-12 ENCOUNTER — HOSPITAL ENCOUNTER (OUTPATIENT)
Facility: HOSPITAL | Age: 83
Discharge: HOME | End: 2023-05-12
Payer: MEDICARE

## 2023-05-12 DIAGNOSIS — R79.89 ELEVATED LFTS: ICD-10-CM

## 2023-05-12 PROCEDURE — 76705 ECHO EXAM OF ABDOMEN: CPT

## 2023-05-16 RX ORDER — FUROSEMIDE 20 MG/1
TABLET ORAL DAILY
COMMUNITY

## 2023-05-16 RX ORDER — HYDROCHLOROTHIAZIDE 25 MG/1
TABLET ORAL
COMMUNITY
Start: 2022-01-30

## 2023-05-16 RX ORDER — CLOPIDOGREL BISULFATE 75 MG/1
1 TABLET ORAL DAILY
COMMUNITY
Start: 2021-05-23

## 2023-05-16 RX ORDER — PSEUDOEPHEDRINE HCL 30 MG
100 TABLET ORAL 2 TIMES DAILY
COMMUNITY

## 2023-05-16 RX ORDER — CLONIDINE HYDROCHLORIDE 0.2 MG/1
0.2 TABLET ORAL 2 TIMES DAILY
COMMUNITY
Start: 2023-02-23

## 2023-05-16 RX ORDER — ATORVASTATIN CALCIUM 80 MG/1
80 TABLET, FILM COATED ORAL DAILY
COMMUNITY
Start: 2023-03-07

## 2023-05-16 RX ORDER — LOSARTAN POTASSIUM 100 MG/1
1 TABLET ORAL DAILY
COMMUNITY
Start: 2021-05-20

## 2023-05-16 RX ORDER — HYDRALAZINE HYDROCHLORIDE 100 MG/1
100 TABLET, FILM COATED ORAL 3 TIMES DAILY
COMMUNITY
Start: 2023-04-18

## 2023-05-17 ENCOUNTER — TELEPHONE (OUTPATIENT)
Age: 83
End: 2023-05-17

## 2023-05-22 NOTE — TELEPHONE ENCOUNTER
Pt id x 3, notified as per Dr. Leon Mcfarlane and verbalized understanding. States that she is limited on the amount of exercise that she is able to do because of her back. Advised to do as much as she can tolerate.

## 2023-06-16 ENCOUNTER — HOSPITAL ENCOUNTER (OUTPATIENT)
Facility: HOSPITAL | Age: 83
Discharge: HOME | End: 2023-06-19
Attending: FAMILY MEDICINE
Payer: MEDICARE

## 2023-06-16 DIAGNOSIS — M54.42 ACUTE BACK PAIN WITH SCIATICA, LEFT: ICD-10-CM

## 2023-06-16 PROCEDURE — 72100 X-RAY EXAM L-S SPINE 2/3 VWS: CPT

## 2023-08-01 RX ORDER — ATORVASTATIN CALCIUM 80 MG/1
80 TABLET, FILM COATED ORAL DAILY
Qty: 100 TABLET | Refills: 1 | Status: SHIPPED | OUTPATIENT
Start: 2023-08-01

## 2023-08-01 NOTE — TELEPHONE ENCOUNTER
Lucas Hall NP,    Shabnam Miranda has been flagged for Adherence by [place] ([facility]). Patient's insurance will allow a 100 day supply for a $0 Copay. Atorvastatin 80 mg last filled on 5/24/2023 for 30 day supply. Next refill due: 6/28/2023 -PAST DUE 34 DAYS     I have pended refills for your approval. Please let me know if there is anything I can help with. Jacoby Fischer    Thank you,  Bernardino Hickman, PharmD, [facility], toll free: [phone] Option 2    Requested Prescriptions     Pending Prescriptions Disp Refills    atorvastatin (LIPITOR) 80 MG tablet 100 tablet 1     Sig: Take 1 tablet by mouth daily      For Pharmacy Admin Tracking Only    Program: Willard in place:  No  Recommendation Provided To: Provider: 1 via Note to Provider  Intervention Detail: New Rx: 1, reason: Improve Adherence  Intervention Accepted By: Provider: 1  Gap Closed?: Yes   Time Spent (min): 15   ================================================================================    POPULATION HEALTH CLINICAL PHARMACY: ADHERENCE REVIEW  Identified care gap per [place] ([facility]) fills with Cristal Res: Statin adherence    Last Visit: 4/18/23  Next Visit: 8/14/23    Patient also appears to be prescribed:ACE/ARB  Medicare [facility]  Per insurer report, LIS-0 - co-pays are based on tiers and patient is subject to coverage gap. ASSESSMENT  ACE/ARB ADHERENCE    Insurance Records claims through 8/1/23 (Prior Year [address] = not reported; YTD [address] = 100%; Potential Fail Date: 10/9/2023):   Losartan 100 mg last filled on 5/3/2023 for 90 day supply. Next refill due: 8/1/2023 -DUE NOW  Per Reconcile Dispense (Epic):   1 refills remaining. BP Readings from Last 3 Encounters:   04/18/23 (!) 183/66   03/17/23 (!) 204/63   03/02/22 (!) 163/57     Estimated Creatinine Clearance: 65 mL/min (based on SCr of 0.77 mg/dL).   Lab Results   Component Value Date

## 2023-08-11 ENCOUNTER — TELEPHONE (OUTPATIENT)
Age: 83
End: 2023-08-11

## 2023-08-11 DIAGNOSIS — M81.0 AGE-RELATED OSTEOPOROSIS WITHOUT CURRENT PATHOLOGICAL FRACTURE: Primary | ICD-10-CM

## 2023-08-11 NOTE — TELEPHONE ENCOUNTER
[facility] needs a refill of Denosumab. They have 0 pills/supply left and are requesting a ? day supply with refills. Pharmacy has been updated in the chart. Patient was advised or scheduled an appointment for the future and to request refills thru the [employer] or by requesting a refill from their pharmacy in the future. Patient was also advised to check with their pharmacy for status of when refills are available.

## 2023-08-17 ENCOUNTER — TELEPHONE (OUTPATIENT)
Age: 83
End: 2023-08-17

## 2023-08-17 DIAGNOSIS — M81.0 AGE-RELATED OSTEOPOROSIS WITHOUT CURRENT PATHOLOGICAL FRACTURE: ICD-10-CM

## 2023-08-17 NOTE — TELEPHONE ENCOUNTER
----- Message from Judith Severino sent at 8/17/2023  3:21 PM EDT -----  Subject: Refill Request    QUESTIONS  Name of Medication? denosumab (PROLIA) 60 MG/ML SOSY SC injection  Patient-reported dosage and instructions? subq injection to be   administered in office every 6 months  How many days do you have left? 0  Preferred Pharmacy? [facility] #[id number]  Pharmacy phone number (if available)? [phone]  Additional Information for Provider? patient called the pharmacy and they   said they never received the order for this when it was placed on 8/11/23,   please try again, patient is coming in on 8/31/23 to get this injected  ---------------------------------------------------------------------------  --------------  CALL BACK INFO  What is the best way for the office to contact you? OK to leave message on   voicemail  Preferred Call Back Phone Number? [phone]  ---------------------------------------------------------------------------  --------------  SCRIPT ANSWERS  Relationship to Patient?  Self

## 2023-08-30 ENCOUNTER — TELEPHONE (OUTPATIENT)
Age: 83
End: 2023-08-30

## 2023-08-30 NOTE — TELEPHONE ENCOUNTER
Yogi Schuster ([phone]) from [facility] is with patient at home doing a [employer]. She just wanted to update you that patient has been having nosebleeds. After stopping taking one of the blood thinner it stopped. Patient is having symptoms of anemia with fatigue & lightheadedness occasionally. She has an appt on 09/19/23 with Dr Mckenzie Koch.

## 2023-08-31 ENCOUNTER — NURSE ONLY (OUTPATIENT)
Age: 83
End: 2023-08-31

## 2023-08-31 DIAGNOSIS — M81.0 AGE-RELATED OSTEOPOROSIS WITHOUT CURRENT PATHOLOGICAL FRACTURE: Primary | ICD-10-CM

## 2023-08-31 PROCEDURE — PBSHW PBB SHADOW CHARGE: Performed by: NURSE PRACTITIONER

## 2023-08-31 RX ADMIN — DENOSUMAB 60 MG: 60 INJECTION SUBCUTANEOUS at 16:27

## 2023-08-31 NOTE — PROGRESS NOTES
After obtaining consent, and per orders of Dr. Viktor Ramirez, injection of prolia given in Left arm by Twylla Sandhoff, LPN. Patient instructed  to report any adverse reaction to me immediately. Lot #:[id number]    Exp #:2/28/2026  [id number]  ORK:[id number]     Patient is to return in 6 mo for next injection.

## 2023-09-19 ENCOUNTER — OFFICE VISIT (OUTPATIENT)
Age: 83
End: 2023-09-19
Payer: MEDICARE

## 2023-09-19 VITALS
BODY MASS INDEX: 28.71 KG/M2 | DIASTOLIC BLOOD PRESSURE: 53 MMHG | HEART RATE: 71 BPM | SYSTOLIC BLOOD PRESSURE: 130 MMHG | OXYGEN SATURATION: 90 % | HEIGHT: 67 IN | TEMPERATURE: 97.6 F | WEIGHT: 182.9 LBS

## 2023-09-19 DIAGNOSIS — R04.0 RECURRENT EPISTAXIS: Primary | ICD-10-CM

## 2023-09-19 DIAGNOSIS — R53.83 OTHER FATIGUE: ICD-10-CM

## 2023-09-19 DIAGNOSIS — J44.9 CHRONIC OBSTRUCTIVE PULMONARY DISEASE, UNSPECIFIED COPD TYPE (HCC): ICD-10-CM

## 2023-09-19 DIAGNOSIS — D64.9 NORMOCYTIC ANEMIA: ICD-10-CM

## 2023-09-19 DIAGNOSIS — I10 ESSENTIAL HYPERTENSION: ICD-10-CM

## 2023-09-19 LAB
ALBUMIN SERPL-MCNC: 3.2 G/DL (ref 3.5–5)
ALBUMIN/GLOB SERPL: 0.8 (ref 1.1–2.2)
ALP SERPL-CCNC: 130 U/L (ref 45–117)
ALT SERPL-CCNC: 39 U/L (ref 12–78)
ANION GAP SERPL CALC-SCNC: 7 MMOL/L (ref 5–15)
AST SERPL-CCNC: 54 U/L (ref 15–37)
BASOPHILS # BLD: 0 K/UL (ref 0–0.1)
BASOPHILS NFR BLD: 0 % (ref 0–1)
BILIRUB SERPL-MCNC: 1.4 MG/DL (ref 0.2–1)
BUN SERPL-MCNC: 39 MG/DL (ref 6–20)
BUN/CREAT SERPL: 37 (ref 12–20)
CALCIUM SERPL-MCNC: 8.4 MG/DL (ref 8.5–10.1)
CHLORIDE SERPL-SCNC: 104 MMOL/L (ref 97–108)
CO2 SERPL-SCNC: 27 MMOL/L (ref 21–32)
CREAT SERPL-MCNC: 1.06 MG/DL (ref 0.55–1.02)
DIFFERENTIAL METHOD BLD: ABNORMAL
EOSINOPHIL # BLD: 0.2 K/UL (ref 0–0.4)
EOSINOPHIL NFR BLD: 3 % (ref 0–7)
ERYTHROCYTE [DISTWIDTH] IN BLOOD BY AUTOMATED COUNT: 18.4 % (ref 11.5–14.5)
GLOBULIN SER CALC-MCNC: 4.2 G/DL (ref 2–4)
GLUCOSE SERPL-MCNC: 112 MG/DL (ref 65–100)
HCT VFR BLD AUTO: 29.8 % (ref 35–47)
HGB BLD-MCNC: 8.9 G/DL (ref 11.5–16)
IMM GRANULOCYTES # BLD AUTO: 0 K/UL
IMM GRANULOCYTES NFR BLD AUTO: 0 %
IRON SERPL-MCNC: 28 UG/DL (ref 35–150)
LYMPHOCYTES # BLD: 0.7 K/UL (ref 0.8–3.5)
LYMPHOCYTES NFR BLD: 10 % (ref 12–49)
MCH RBC QN AUTO: 28.3 PG (ref 26–34)
MCHC RBC AUTO-ENTMCNC: 29.9 G/DL (ref 30–36.5)
MCV RBC AUTO: 94.6 FL (ref 80–99)
MONOCYTES # BLD: 0.6 K/UL (ref 0–1)
MONOCYTES NFR BLD: 9 % (ref 5–13)
NEUTS SEG # BLD: 5.7 K/UL (ref 1.8–8)
NEUTS SEG NFR BLD: 78 % (ref 32–75)
NRBC # BLD: 0 K/UL (ref 0–0.01)
NRBC BLD-RTO: 0 PER 100 WBC
PLATELET # BLD AUTO: 247 K/UL (ref 150–400)
PMV BLD AUTO: 9.6 FL (ref 8.9–12.9)
POTASSIUM SERPL-SCNC: 4.3 MMOL/L (ref 3.5–5.1)
PROT SERPL-MCNC: 7.4 G/DL (ref 6.4–8.2)
RBC # BLD AUTO: 3.15 M/UL (ref 3.8–5.2)
RBC MORPH BLD: ABNORMAL
SODIUM SERPL-SCNC: 138 MMOL/L (ref 136–145)
TSH SERPL DL<=0.05 MIU/L-ACNC: 2.78 UIU/ML (ref 0.36–3.74)
WBC # BLD AUTO: 7.2 K/UL (ref 3.6–11)

## 2023-09-19 PROCEDURE — 99214 OFFICE O/P EST MOD 30 MIN: CPT | Performed by: STUDENT IN AN ORGANIZED HEALTH CARE EDUCATION/TRAINING PROGRAM

## 2023-09-19 PROCEDURE — 3075F SYST BP GE 130 - 139MM HG: CPT | Performed by: STUDENT IN AN ORGANIZED HEALTH CARE EDUCATION/TRAINING PROGRAM

## 2023-09-19 PROCEDURE — 1123F ACP DISCUSS/DSCN MKR DOCD: CPT | Performed by: STUDENT IN AN ORGANIZED HEALTH CARE EDUCATION/TRAINING PROGRAM

## 2023-09-19 PROCEDURE — 3078F DIAST BP <80 MM HG: CPT | Performed by: STUDENT IN AN ORGANIZED HEALTH CARE EDUCATION/TRAINING PROGRAM

## 2023-09-19 RX ORDER — HYDRALAZINE HYDROCHLORIDE 50 MG/1
50 TABLET, FILM COATED ORAL 3 TIMES DAILY
Qty: 90 TABLET | Refills: 3 | Status: SHIPPED | OUTPATIENT
Start: 2023-09-19

## 2023-09-19 NOTE — PATIENT INSTRUCTIONS
While your blood pressure is running low, back off on hydralazine to 50 mg. Blood pressure should stay above 60 for the bottom number. Make sure you are safe.

## 2023-09-20 DIAGNOSIS — R79.89 ELEVATED SERUM CREATININE: ICD-10-CM

## 2023-09-20 DIAGNOSIS — D64.9 NORMOCYTIC ANEMIA: Primary | ICD-10-CM

## 2023-10-12 ENCOUNTER — TELEPHONE (OUTPATIENT)
Age: 83
End: 2023-10-12

## 2023-10-12 NOTE — TELEPHONE ENCOUNTER
----- Message from Kevin Beltran sent at 10/12/2023  2:14 PM EDT -----  Subject: Message to Provider    QUESTIONS  Information for Provider? Kirk with [address] at Home called to   ask if Dr. Jarred Matson would sign off on orders from Pt to have PT/OT? Call Kirk   at [phone], secure VM.   ---------------------------------------------------------------------------  --------------  Rachana Barrett INFO  [phone]; OK to leave message on voicemail  ---------------------------------------------------------------------------  --------------  SCRIPT ANSWERS  Relationship to Patient? Covered Entity  Covered Entity Type? Home Health Care? Representative Name?  Kirk

## 2023-10-17 ENCOUNTER — TELEPHONE (OUTPATIENT)
Age: 83
End: 2023-10-17

## 2023-10-17 NOTE — TELEPHONE ENCOUNTER
----- Message from Alida Mack sent at 10/17/2023 11:39 AM EDT -----  Subject: Referral Request    Reason for referral request? saw patient at her home on 10/14/23 needs a   referral/verbal approval to continue services. fax # [phone]   [phone] gideon   Provider patient wants to be referred to(if known):     Provider Phone Number(if known):     Additional Information for Provider?   ---------------------------------------------------------------------------  --------------  [address]    [phone]; OK to leave message on voicemail  ---------------------------------------------------------------------------  --------------

## 2023-10-27 ENCOUNTER — CLINICAL DOCUMENTATION (OUTPATIENT)
Age: 83
End: 2023-10-27

## 2023-10-27 NOTE — PROGRESS NOTES
[facility] home health certification & POC were put on Dr Artis Weathers desk to process palpitations/chest tightness

## 2023-11-09 ENCOUNTER — OFFICE VISIT (OUTPATIENT)
Age: 83
End: 2023-11-09
Payer: MEDICARE

## 2023-11-09 VITALS
TEMPERATURE: 98.1 F | DIASTOLIC BLOOD PRESSURE: 71 MMHG | RESPIRATION RATE: 16 BRPM | HEIGHT: 67 IN | BODY MASS INDEX: 27.31 KG/M2 | HEART RATE: 58 BPM | SYSTOLIC BLOOD PRESSURE: 138 MMHG | OXYGEN SATURATION: 98 % | WEIGHT: 174 LBS

## 2023-11-09 DIAGNOSIS — R79.89 ELEVATED SERUM CREATININE: ICD-10-CM

## 2023-11-09 DIAGNOSIS — R04.0 RECURRENT EPISTAXIS: ICD-10-CM

## 2023-11-09 DIAGNOSIS — D50.0 IRON DEFICIENCY ANEMIA DUE TO CHRONIC BLOOD LOSS: Primary | ICD-10-CM

## 2023-11-09 DIAGNOSIS — R79.89 ELEVATED LFTS: ICD-10-CM

## 2023-11-09 DIAGNOSIS — E78.2 MIXED HYPERLIPIDEMIA: ICD-10-CM

## 2023-11-09 PROCEDURE — 1123F ACP DISCUSS/DSCN MKR DOCD: CPT | Performed by: FAMILY MEDICINE

## 2023-11-09 PROCEDURE — 3075F SYST BP GE 130 - 139MM HG: CPT | Performed by: FAMILY MEDICINE

## 2023-11-09 PROCEDURE — 99214 OFFICE O/P EST MOD 30 MIN: CPT | Performed by: FAMILY MEDICINE

## 2023-11-09 PROCEDURE — 3078F DIAST BP <80 MM HG: CPT | Performed by: FAMILY MEDICINE

## 2023-11-09 RX ORDER — TRAMADOL HYDROCHLORIDE 50 MG/1
50 TABLET ORAL EVERY 6 HOURS PRN
COMMUNITY

## 2023-11-09 RX ORDER — SPIRONOLACTONE 25 MG/1
25 TABLET ORAL DAILY
COMMUNITY

## 2023-11-09 RX ORDER — METOPROLOL SUCCINATE 25 MG/1
12.5 TABLET, EXTENDED RELEASE ORAL DAILY
COMMUNITY

## 2023-11-09 RX ORDER — SACUBITRIL AND VALSARTAN 24; 26 MG/1; MG/1
1 TABLET, FILM COATED ORAL 2 TIMES DAILY
COMMUNITY

## 2023-11-09 SDOH — ECONOMIC STABILITY: HOUSING INSECURITY
IN THE LAST 12 MONTHS, WAS THERE A TIME WHEN YOU DID NOT HAVE A STEADY PLACE TO SLEEP OR SLEPT IN A SHELTER (INCLUDING NOW)?: NO

## 2023-11-09 SDOH — ECONOMIC STABILITY: FOOD INSECURITY: WITHIN THE PAST 12 MONTHS, YOU WORRIED THAT YOUR FOOD WOULD RUN OUT BEFORE YOU GOT MONEY TO BUY MORE.: NEVER TRUE

## 2023-11-09 SDOH — ECONOMIC STABILITY: INCOME INSECURITY: HOW HARD IS IT FOR YOU TO PAY FOR THE VERY BASICS LIKE FOOD, HOUSING, MEDICAL CARE, AND HEATING?: NOT HARD AT ALL

## 2023-11-09 SDOH — ECONOMIC STABILITY: FOOD INSECURITY: WITHIN THE PAST 12 MONTHS, THE FOOD YOU BOUGHT JUST DIDN'T LAST AND YOU DIDN'T HAVE MONEY TO GET MORE.: NEVER TRUE

## 2023-11-09 NOTE — PROGRESS NOTES
Chief Complaint   Patient presents with    Follow-Up from Hospital     Patient presents in office today for hospital f/u from [facility].  Admitted on 10/8/23 for nose bleed. Discharged on 10/12/23. No concerns. 1. \"Have you been to the ER, urgent care clinic since your last visit? Hospitalized since your last visit? \" Yes When: 10/8/23 Where: [facility] Reason for visit: nose bleed    2. \"Have you seen or consulted any other health care providers outside of the [address] [place] [address] since your last visit? \" No     3. For patients aged 43-73: Has the patient had a colonoscopy / FIT/ Cologuard? NA - based on age      If the patient is female:    4. For patients aged 43-66: Has the patient had a mammogram within the past 2 years? NA - based on age or sex      11. For patients aged 21-65: Has the patient had a pap smear?  NA - based on age or sex

## 2023-11-10 LAB
ALBUMIN SERPL-MCNC: 3.4 G/DL (ref 3.5–5)
ALBUMIN/GLOB SERPL: 0.8 (ref 1.1–2.2)
ALP SERPL-CCNC: 109 U/L (ref 45–117)
ALT SERPL-CCNC: 36 U/L (ref 12–78)
ANION GAP SERPL CALC-SCNC: 6 MMOL/L (ref 5–15)
AST SERPL-CCNC: 40 U/L (ref 15–37)
BILIRUB SERPL-MCNC: 0.5 MG/DL (ref 0.2–1)
BUN SERPL-MCNC: 34 MG/DL (ref 6–20)
BUN/CREAT SERPL: 36 (ref 12–20)
CALCIUM SERPL-MCNC: 9.3 MG/DL (ref 8.5–10.1)
CHLORIDE SERPL-SCNC: 103 MMOL/L (ref 97–108)
CHOLEST SERPL-MCNC: 101 MG/DL
CO2 SERPL-SCNC: 27 MMOL/L (ref 21–32)
CREAT SERPL-MCNC: 0.94 MG/DL (ref 0.55–1.02)
ERYTHROCYTE [DISTWIDTH] IN BLOOD BY AUTOMATED COUNT: 16.8 % (ref 11.5–14.5)
FERRITIN SERPL-MCNC: 75 NG/ML (ref 26–388)
GLOBULIN SER CALC-MCNC: 4.3 G/DL (ref 2–4)
GLUCOSE SERPL-MCNC: 85 MG/DL (ref 65–100)
HCT VFR BLD AUTO: 35.8 % (ref 35–47)
HDLC SERPL-MCNC: 51 MG/DL
HDLC SERPL: 2 (ref 0–5)
HGB BLD-MCNC: 10.5 G/DL (ref 11.5–16)
IRON SATN MFR SERPL: 8 % (ref 20–50)
IRON SERPL-MCNC: 33 UG/DL (ref 35–150)
LDLC SERPL CALC-MCNC: 34.8 MG/DL (ref 0–100)
MCH RBC QN AUTO: 25.7 PG (ref 26–34)
MCHC RBC AUTO-ENTMCNC: 29.3 G/DL (ref 30–36.5)
MCV RBC AUTO: 87.5 FL (ref 80–99)
NRBC # BLD: 0 K/UL (ref 0–0.01)
NRBC BLD-RTO: 0 PER 100 WBC
PLATELET # BLD AUTO: 232 K/UL (ref 150–400)
PMV BLD AUTO: 10.4 FL (ref 8.9–12.9)
POTASSIUM SERPL-SCNC: 5.4 MMOL/L (ref 3.5–5.1)
PROT SERPL-MCNC: 7.7 G/DL (ref 6.4–8.2)
RBC # BLD AUTO: 4.09 M/UL (ref 3.8–5.2)
SODIUM SERPL-SCNC: 136 MMOL/L (ref 136–145)
TIBC SERPL-MCNC: 414 UG/DL (ref 250–450)
TRIGL SERPL-MCNC: 76 MG/DL
VLDLC SERPL CALC-MCNC: 15.2 MG/DL
WBC # BLD AUTO: 5.6 K/UL (ref 3.6–11)

## 2023-12-06 ENCOUNTER — TELEPHONE (OUTPATIENT)
Dept: PHARMACY | Facility: CLINIC | Age: 83
End: 2023-12-06

## 2023-12-06 NOTE — TELEPHONE ENCOUNTER
[facility] CLINICAL PHARMACY: ADHERENCE REVIEW  Identified care gap per [employer] fills at [facility]: Statin adherence    ASSESSMENT     [address] Records claims through  23  (Prior Year [address] = not reported;  [address] = 81%; Potential Fail Date: 23):   ATORVASTATIN TAB 80 MG last filled on 10.30.23 for 30 day supply. Next refill due: 23 -PAST DUE 6 DAYS     Prescribed si tablet/capsule daily    Per Insurer Portal: last filled on 10.30.23 for 30 day supply. Per [facility]: last picked up on 10.30.23 for 30 day supply. will get 90 day supply ready to  since past due. Billed through [place] ([facility]). 0 refills remaining. Lab Results   Component Value Date    CHOL 101 2023    TRIG 76 2023    HDL 51 2023    LDLCALC 34.8 2023     ALT   Date Value Ref Range Status   2023 36 12 - 78 U/L Final     AST   Date Value Ref Range Status   2023 40 (H) 15 - 37 U/L Final     The ASCVD Risk score (Emily DK, et al., 2019) failed to calculate for the following reasons: The 2019 ASCVD risk score is only valid for ages 36 to 78     PLAN    Per insurer report, LIS-1 - may be able to receive up to a 100-day supply for the same cost as a 30-day supply    The following are interventions that have been identified:   Patient overdue refilling ATORVASTATIN TAB 80 MG and active on home medication list.   Refill/s of ATORVASTATIN TAB 80 MG will be READY to  after 2 PM on 23 at patient's [address] [place] [address] Road for a $0 co pay. Reached patient to review. Spoke to [facility] who states she is taking ATORVASTATIN TAB 80 MG, 1 tablet daily. She is tolerating the medication well with no side effects that's she's aware of. Informed her that her next refill is getting processed at [facility] and will be available to  Friday, . She voices understanding.      Verified medication instructions     Last Visit: 23  Next Visit:

## 2023-12-08 NOTE — TELEPHONE ENCOUNTER
Bro Naidu DO ,    Caitlin Frazier has been flagged for Adherence by [place] ([facility]). Patient's insurance will allow a 100 day supply for a $0 Copay. Drug:  Last Filled:  Due:  Qty:  Supply:  Prescriber:  Pharmacy:    Refill Status:  Max Refill Date:  Absolute Fail Date: ATORVASTATIN TAB 80MG   10/30/2023  2023  30  30 Days  [id number] [phone]  11 days late  2023       I have pended refills for your approval and changed to a 100 day supply. Please let me know if there is anything I can help with. MTV:[id number]  NOV:24    Thank you,  Redd Cr, PharmD, [facility], toll free: [phone] Option 2    Requested Prescriptions     Pending Prescriptions Disp Refills    atorvastatin (LIPITOR) 80 MG tablet 100 tablet 1     Sig: Take 1 tablet by mouth daily      Thank you, Dr. Liu Proctor and Diamond Robbins!     For Pharmacy Admin Tracking Only    Program: Willard in place:  No  Recommendation Provided To: Provider: 1 via Note to Provider and Patient/Caregiver: 1 via Telephone  Intervention Detail: Adherence Monitorin and New Rx: 1, reason: Improve Adherence  Intervention Accepted By: Provider: 1 and Patient/Caregiver: 1  Gap Closed?: Yes   Time Spent (min): 30   ================================================================================

## 2023-12-08 NOTE — TELEPHONE ENCOUNTER
Per Dennise, no refills on file. Routing to pharmacist for refill request. Looks like order on 8. 1.23 did not transmit.

## 2023-12-12 RX ORDER — ATORVASTATIN CALCIUM 80 MG/1
80 TABLET, FILM COATED ORAL DAILY
Qty: 100 TABLET | Refills: 3 | Status: SHIPPED | OUTPATIENT
Start: 2023-12-12

## 2024-01-01 ENCOUNTER — TELEPHONE (OUTPATIENT)
Age: 84
End: 2024-01-01

## 2024-02-21 ENCOUNTER — TELEPHONE (OUTPATIENT)
Age: 84
End: 2024-02-21

## 2024-02-21 DIAGNOSIS — M81.0 AGE-RELATED OSTEOPOROSIS WITHOUT CURRENT PATHOLOGICAL FRACTURE: ICD-10-CM

## 2024-02-21 NOTE — TELEPHONE ENCOUNTER
[facility] needs a refill of Denosumab.  They have 0 pills/supply left and are requesting a ? day supply with refills.  Pharmacy has been updated in the chart. Patient was advised or scheduled an appointment for the future and to request refills thru the [employer] or by requesting a refill from their pharmacy in the future.  Patient was also advised to check with their pharmacy for status of when refills are available.

## 2024-02-29 ENCOUNTER — NURSE ONLY (OUTPATIENT)
Age: 84
End: 2024-02-29

## 2024-02-29 DIAGNOSIS — M81.8 OTHER OSTEOPOROSIS, UNSPECIFIED PATHOLOGICAL FRACTURE PRESENCE: Primary | ICD-10-CM

## 2024-02-29 PROCEDURE — PBSHW PBB SHADOW CHARGE: Performed by: NURSE PRACTITIONER

## 2024-02-29 RX ADMIN — DENOSUMAB 60 MG: 60 INJECTION SUBCUTANEOUS at 16:30

## 2024-02-29 NOTE — PROGRESS NOTES
Chief Complaint   Patient presents with    Injections     prolia    Patient in office today for the prolia injection no reaction.  Nurse visit only

## 2024-03-11 RX ORDER — APIXABAN 5 MG/1
5 TABLET, FILM COATED ORAL 2 TIMES DAILY
Qty: 180 TABLET | Refills: 1 | Status: SHIPPED | OUTPATIENT
Start: 2024-03-11

## 2024-03-13 ENCOUNTER — OFFICE VISIT (OUTPATIENT)
Age: 84
End: 2024-03-13
Payer: MEDICARE

## 2024-03-13 VITALS
WEIGHT: 181 LBS | HEIGHT: 67 IN | HEART RATE: 54 BPM | BODY MASS INDEX: 28.41 KG/M2 | TEMPERATURE: 98.1 F | SYSTOLIC BLOOD PRESSURE: 172 MMHG | DIASTOLIC BLOOD PRESSURE: 68 MMHG | OXYGEN SATURATION: 92 % | RESPIRATION RATE: 17 BRPM

## 2024-03-13 DIAGNOSIS — I10 ESSENTIAL HYPERTENSION: ICD-10-CM

## 2024-03-13 DIAGNOSIS — R79.89 ELEVATED SERUM CREATININE: ICD-10-CM

## 2024-03-13 DIAGNOSIS — D64.9 NORMOCYTIC ANEMIA: Primary | ICD-10-CM

## 2024-03-13 PROCEDURE — 1123F ACP DISCUSS/DSCN MKR DOCD: CPT | Performed by: STUDENT IN AN ORGANIZED HEALTH CARE EDUCATION/TRAINING PROGRAM

## 2024-03-13 PROCEDURE — 3078F DIAST BP <80 MM HG: CPT | Performed by: STUDENT IN AN ORGANIZED HEALTH CARE EDUCATION/TRAINING PROGRAM

## 2024-03-13 PROCEDURE — 99214 OFFICE O/P EST MOD 30 MIN: CPT | Performed by: STUDENT IN AN ORGANIZED HEALTH CARE EDUCATION/TRAINING PROGRAM

## 2024-03-13 PROCEDURE — 3077F SYST BP >= 140 MM HG: CPT | Performed by: STUDENT IN AN ORGANIZED HEALTH CARE EDUCATION/TRAINING PROGRAM

## 2024-03-13 RX ORDER — HYDRALAZINE HYDROCHLORIDE 25 MG/1
25 TABLET, FILM COATED ORAL 3 TIMES DAILY
Qty: 90 TABLET | Refills: 0 | Status: SHIPPED
Start: 2024-03-13

## 2024-03-13 ASSESSMENT — PATIENT HEALTH QUESTIONNAIRE - PHQ9
SUM OF ALL RESPONSES TO PHQ9 QUESTIONS 1 & 2: 0
SUM OF ALL RESPONSES TO PHQ QUESTIONS 1-9: 0
SUM OF ALL RESPONSES TO PHQ QUESTIONS 1-9: 0
1. LITTLE INTEREST OR PLEASURE IN DOING THINGS: NOT AT ALL
SUM OF ALL RESPONSES TO PHQ QUESTIONS 1-9: 0
SUM OF ALL RESPONSES TO PHQ QUESTIONS 1-9: 0
2. FEELING DOWN, DEPRESSED OR HOPELESS: NOT AT ALL

## 2024-03-13 NOTE — PROGRESS NOTES
Chief Complaint   Patient presents with    Iron Deficiency         \"Have you been to the ER, urgent care clinic since your last visit?  Hospitalized since your last visit?\"    NO    “Have you seen or consulted any other health care providers outside of [facility] since your last visit?”    NO            Click Here for Release of Records Request

## 2024-03-13 NOTE — PROGRESS NOTES
Progress Note    she is a 84 y.o. year old female who presents for evaluation of Iron Deficiency and Medication Check (All medications were updated today.)        Assessment/ Plan:     1. Normocytic anemia  Hemoglobin 8.9 on 9/19/2023, 10.5 on 11/9/2023 with low iron  Continue iron supplementation pending labs as below  -     CBC; Future  -     Ferritin; Future  -     Iron and TIBC; Future  2. Essential hypertension  Uncontrolled, restart hydralazine  -     hydrALAZINE (APRESOLINE) 25 MG tablet; Take 1 tablet by mouth 3 times daily, Disp-90 tablet, R-0Adjust Sig      Return if symptoms worsen or fail to improve.      I have discussed the diagnosis with the patient and the intended plan as seen in the above orders.    The patient has received an after-visit summary and questions were answered concerning future plans.   Pt conveyed understanding of plan.    Medication Side Effects and Warnings were discussed with patient        Subjective:     Chief Complaint   Patient presents with    Iron Deficiency    Medication Check     All medications were updated today.     No further nose bleeds  Has stopped Plavix  Continues to take iron.  No side effects    Home bp reported as 130-140 systolic  She has had a lot of med changes with cardiology  She has follow-up arranged.      Reviewed PmHx, RxHx, FmHx, SocHx, AllgHx and updated and dated in the chart.    Review of Systems - negative except as listed above in the HPI    Objective:     Vitals:    03/13/24 1506   BP: (!) 172/68   Site: Left Upper Arm   Position: Sitting   Cuff Size: Large Adult   Pulse: 54   Resp: 17   Temp: 98.1 °F (36.7 °C)   TempSrc: Oral   SpO2: 92%   Weight: 82.1 kg (181 lb)   Height: 1.702 m (5' 7\")       Current Outpatient Medications   Medication Sig    hydrALAZINE (APRESOLINE) 25 MG tablet Take 1 tablet by mouth 3 times daily    apixaban (ELIQUIS) 5 MG TABS tablet TAKE 1 TABLET BY MOUTH TWICE DAILY    denosumab (PROLIA) 60 MG/ML SOSY SC injection

## 2024-03-13 NOTE — PATIENT INSTRUCTIONS
You should purchase a blood pressure cuff to check your blood pressure at home.    Check first thing in the morning.  You should be relaxed, seated with back supported, feet flat on the floor, arm with cuff resting at heart height.  You should check your blood pressure 1-3 times.  Please write down your blood pressures and bring this record and your blood pressure cuff/machine to your follow-up visit.     I would like for your blood pressure to be less than 140 for the top number and less than 90 for the bottom number.   Please follow-up sooner for consistently high blood pressure readings at home.

## 2024-03-14 LAB
ERYTHROCYTE [DISTWIDTH] IN BLOOD BY AUTOMATED COUNT: 16.8 % (ref 11.5–14.5)
FERRITIN SERPL-MCNC: 89 NG/ML (ref 26–388)
HCT VFR BLD AUTO: 40.3 % (ref 35–47)
HGB BLD-MCNC: 12.6 G/DL (ref 11.5–16)
IRON SATN MFR SERPL: 12 % (ref 20–50)
IRON SERPL-MCNC: 45 UG/DL (ref 35–150)
MCH RBC QN AUTO: 29.8 PG (ref 26–34)
MCHC RBC AUTO-ENTMCNC: 31.3 G/DL (ref 30–36.5)
MCV RBC AUTO: 95.3 FL (ref 80–99)
NRBC # BLD: 0 K/UL (ref 0–0.01)
NRBC BLD-RTO: 0 PER 100 WBC
PLATELET # BLD AUTO: 169 K/UL (ref 150–400)
PMV BLD AUTO: 9.8 FL (ref 8.9–12.9)
RBC # BLD AUTO: 4.23 M/UL (ref 3.8–5.2)
TIBC SERPL-MCNC: 385 UG/DL (ref 250–450)
WBC # BLD AUTO: 6 K/UL (ref 3.6–11)

## 2024-09-10 NOTE — TELEPHONE ENCOUNTER
We received a fax refill request for [facility].  Please escribe Eliquis to their pharmacy.  The pharmacy is correct in the chart and they are requesting a 90 day supply.

## 2024-09-10 NOTE — TELEPHONE ENCOUNTER
Refill sent.  Please call patient to schedule a routine follow-up and establish with a new provider.

## 2024-09-11 ENCOUNTER — CLINICAL DOCUMENTATION (OUTPATIENT)
Age: 84
End: 2024-09-11

## 2024-09-11 NOTE — TELEPHONE ENCOUNTER
Called to make pt an apt and spoke to her daughter Ursula Gifford and she wanted me to let you know she went on hospice on Friday 9.6.24 and she passed away today 9.11.24. Darcy has been notified.